# Patient Record
Sex: FEMALE | Race: BLACK OR AFRICAN AMERICAN | Employment: FULL TIME | URBAN - METROPOLITAN AREA
[De-identification: names, ages, dates, MRNs, and addresses within clinical notes are randomized per-mention and may not be internally consistent; named-entity substitution may affect disease eponyms.]

---

## 2019-09-13 ENCOUNTER — TELEPHONE (OUTPATIENT)
Dept: FAMILY MEDICINE CLINIC | Age: 24
End: 2019-09-13

## 2019-09-13 ENCOUNTER — OFFICE VISIT (OUTPATIENT)
Dept: FAMILY MEDICINE CLINIC | Age: 24
End: 2019-09-13
Payer: COMMERCIAL

## 2019-09-13 VITALS
SYSTOLIC BLOOD PRESSURE: 142 MMHG | BODY MASS INDEX: 50.02 KG/M2 | WEIGHT: 293 LBS | HEIGHT: 64 IN | DIASTOLIC BLOOD PRESSURE: 86 MMHG

## 2019-09-13 DIAGNOSIS — Z76.89 ENCOUNTER TO ESTABLISH CARE: Primary | ICD-10-CM

## 2019-09-13 DIAGNOSIS — R35.89 POLYURIA: ICD-10-CM

## 2019-09-13 DIAGNOSIS — R19.4 FREQUENT BOWEL MOVEMENTS: ICD-10-CM

## 2019-09-13 DIAGNOSIS — E66.01 CLASS 3 SEVERE OBESITY WITH SERIOUS COMORBIDITY AND BODY MASS INDEX (BMI) OF 60.0 TO 69.9 IN ADULT, UNSPECIFIED OBESITY TYPE (HCC): ICD-10-CM

## 2019-09-13 DIAGNOSIS — Z12.4 CERVICAL CANCER SCREENING: ICD-10-CM

## 2019-09-13 DIAGNOSIS — N39.46 MIXED INCONTINENCE: ICD-10-CM

## 2019-09-13 DIAGNOSIS — Z86.69 HISTORY OF ABSENCE SEIZURES: ICD-10-CM

## 2019-09-13 DIAGNOSIS — R03.0 ELEVATED BP WITHOUT DIAGNOSIS OF HYPERTENSION: ICD-10-CM

## 2019-09-13 LAB
A/G RATIO: 1.4 (ref 1.1–2.2)
ALBUMIN SERPL-MCNC: 4.2 G/DL (ref 3.4–5)
ALP BLD-CCNC: 78 U/L (ref 40–129)
ALT SERPL-CCNC: 10 U/L (ref 10–40)
ANION GAP SERPL CALCULATED.3IONS-SCNC: 13 MMOL/L (ref 3–16)
AST SERPL-CCNC: 12 U/L (ref 15–37)
BILIRUB SERPL-MCNC: <0.2 MG/DL (ref 0–1)
BUN BLDV-MCNC: 9 MG/DL (ref 7–20)
CALCIUM SERPL-MCNC: 8.9 MG/DL (ref 8.3–10.6)
CHLORIDE BLD-SCNC: 103 MMOL/L (ref 99–110)
CO2: 24 MMOL/L (ref 21–32)
CREAT SERPL-MCNC: 0.6 MG/DL (ref 0.6–1.1)
GFR AFRICAN AMERICAN: >60
GFR NON-AFRICAN AMERICAN: >60
GLOBULIN: 3 G/DL
GLUCOSE BLD-MCNC: 100 MG/DL (ref 70–99)
HCT VFR BLD CALC: 29.9 % (ref 36–48)
HEMATOLOGY PATH CONSULT: YES
HEMOGLOBIN: 8.8 G/DL (ref 12–16)
MCH RBC QN AUTO: 18.8 PG (ref 26–34)
MCHC RBC AUTO-ENTMCNC: 29.5 G/DL (ref 31–36)
MCV RBC AUTO: 63.8 FL (ref 80–100)
PDW BLD-RTO: 19.3 % (ref 12.4–15.4)
PLATELET # BLD: 372 K/UL (ref 135–450)
PMV BLD AUTO: 9 FL (ref 5–10.5)
POTASSIUM SERPL-SCNC: 4.3 MMOL/L (ref 3.5–5.1)
RBC # BLD: 4.69 M/UL (ref 4–5.2)
SLIDE REVIEW: ABNORMAL
SODIUM BLD-SCNC: 140 MMOL/L (ref 136–145)
TOTAL PROTEIN: 7.2 G/DL (ref 6.4–8.2)
WBC # BLD: 5.4 K/UL (ref 4–11)

## 2019-09-13 PROCEDURE — A4663 DIALYSIS BLOOD PRESSURE CUFF: HCPCS | Performed by: FAMILY MEDICINE

## 2019-09-13 PROCEDURE — 99202 OFFICE O/P NEW SF 15 MIN: CPT | Performed by: FAMILY MEDICINE

## 2019-09-13 PROCEDURE — 36415 COLL VENOUS BLD VENIPUNCTURE: CPT | Performed by: FAMILY MEDICINE

## 2019-09-13 RX ORDER — LAMOTRIGINE 50 MG/1
150 TABLET, EXTENDED RELEASE ORAL DAILY
Status: ON HOLD | COMMUNITY
Start: 2017-06-05 | End: 2022-05-01

## 2019-09-13 ASSESSMENT — ENCOUNTER SYMPTOMS
VOMITING: 0
ABDOMINAL PAIN: 1
SORE THROAT: 0
NAUSEA: 0
COUGH: 0
SHORTNESS OF BREATH: 0
WHEEZING: 0
DIARRHEA: 0

## 2019-09-13 ASSESSMENT — PATIENT HEALTH QUESTIONNAIRE - PHQ9
SUM OF ALL RESPONSES TO PHQ9 QUESTIONS 1 & 2: 0
1. LITTLE INTEREST OR PLEASURE IN DOING THINGS: 0
SUM OF ALL RESPONSES TO PHQ QUESTIONS 1-9: 0
SUM OF ALL RESPONSES TO PHQ QUESTIONS 1-9: 0
2. FEELING DOWN, DEPRESSED OR HOPELESS: 0

## 2019-09-13 NOTE — PROGRESS NOTES
Conjunctivae are normal. Right eye exhibits no discharge. Left eye exhibits no discharge. Cardiovascular: Normal rate, regular rhythm and normal heart sounds. No murmur heard. Pulmonary/Chest: Effort normal and breath sounds normal. She has no wheezes. Abdominal: Soft. Bowel sounds are normal. There is no tenderness. Neurological: She is alert and oriented to person, place, and time. Skin: Skin is warm and dry. Psychiatric: She has a normal mood and affect. Her behavior is normal. Judgment and thought content normal.       ASSESSMENT/PLAN:  1. Encounter to establish care  Care established  Medications reviewed  Questions answered  Labs obtained  RTC in one month for follow up. 2. History of absence seizures  Referred to Neurology  Discussed medication use, patient forgets to take her medication  Discussed signs and symptoms for immediate evaluation in the ER.  - AFL - Nahomy Wise MD, Neurology, [facility]  - CBC  - Comprehensive Metabolic Panel  - Hemoglobin A1C  - LA DIALYSIS BLOOD PRESSURE CUFF    3. Mixed incontinence  Refer to Urology  Patient had questions answered during the exam.  - Heike Medina MD, [facility], [facility]  - CBC  - Comprehensive Metabolic Panel  - Hemoglobin A1C  - LA DIALYSIS BLOOD PRESSURE CUFF  - POCT urine pregnancy; Future  - POCT Urinalysis no Micro; Future    4. Class 3 severe obesity with serious comorbidity and body mass index (BMI) of 60.0 to 69.9 in adult, unspecified obesity type ([address]  Discussed the need to exercise 30 minutes each day. Monitor diet and push water. Reduce refined carbs and replace with fiber and vegetables. Decrease portion size and limit snacking, stop eating after dinner  Count calories. - CBC  - Comprehensive Metabolic Panel  - Hemoglobin A1C  - LA DIALYSIS BLOOD PRESSURE CUFF    5.  Cervical cancer screening  Refer to GYN for exam  - Katharine Dias MD, Gynecology, [facility]

## 2019-09-14 LAB
ESTIMATED AVERAGE GLUCOSE: 131.2 MG/DL
HBA1C MFR BLD: 6.2 %

## 2019-09-16 ENCOUNTER — TELEPHONE (OUTPATIENT)
Dept: FAMILY MEDICINE CLINIC | Age: 24
End: 2019-09-16

## 2019-09-16 LAB — HEMATOLOGY PATH CONSULT: NORMAL

## 2019-09-16 ASSESSMENT — ENCOUNTER SYMPTOMS
CONSTIPATION: 0
ANAL BLEEDING: 0
BLOOD IN STOOL: 0
RECTAL PAIN: 0

## 2019-09-16 NOTE — TELEPHONE ENCOUNTER
I called and LVM informing the patient that there is a $35 fee to process her forms. We can process that payment over the phone and Dr. Shay Jacobsen wanted to talk to the patient when she picked these up, but it does say she wanted the forms faxed, so she might not be coming in to  these forms.

## 2019-10-11 ENCOUNTER — OFFICE VISIT (OUTPATIENT)
Dept: FAMILY MEDICINE CLINIC | Age: 24
End: 2019-10-11
Payer: COMMERCIAL

## 2019-10-11 VITALS
WEIGHT: 293 LBS | BODY MASS INDEX: 50.02 KG/M2 | DIASTOLIC BLOOD PRESSURE: 88 MMHG | SYSTOLIC BLOOD PRESSURE: 130 MMHG | HEIGHT: 64 IN

## 2019-10-11 DIAGNOSIS — R73.03 PRE-DIABETES: ICD-10-CM

## 2019-10-11 DIAGNOSIS — D64.9 ANEMIA, UNSPECIFIED TYPE: Primary | ICD-10-CM

## 2019-10-11 DIAGNOSIS — E66.01 CLASS 3 SEVERE OBESITY DUE TO EXCESS CALORIES WITHOUT SERIOUS COMORBIDITY WITH BODY MASS INDEX (BMI) OF 60.0 TO 69.9 IN ADULT (HCC): ICD-10-CM

## 2019-10-11 DIAGNOSIS — Z12.4 CERVICAL CANCER SCREENING: ICD-10-CM

## 2019-10-11 PROCEDURE — 99213 OFFICE O/P EST LOW 20 MIN: CPT | Performed by: FAMILY MEDICINE

## 2019-10-11 ASSESSMENT — ENCOUNTER SYMPTOMS
CHEST TIGHTNESS: 0
DIARRHEA: 0
COUGH: 0
SORE THROAT: 0
SINUS PRESSURE: 0
RHINORRHEA: 0
SHORTNESS OF BREATH: 0
NAUSEA: 0
VOMITING: 0
ABDOMINAL PAIN: 0
WHEEZING: 0

## 2019-10-13 ASSESSMENT — ENCOUNTER SYMPTOMS
COLOR CHANGE: 0
EYE DISCHARGE: 0

## 2019-12-03 ENCOUNTER — TELEPHONE (OUTPATIENT)
Dept: FAMILY MEDICINE CLINIC | Age: 24
End: 2019-12-03

## 2020-02-07 ENCOUNTER — HOSPITAL ENCOUNTER (EMERGENCY)
Age: 25
Discharge: HOME | End: 2020-02-07
Payer: COMMERCIAL

## 2020-02-07 VITALS
SYSTOLIC BLOOD PRESSURE: 124 MMHG | BODY MASS INDEX: 50.02 KG/M2 | HEIGHT: 64 IN | RESPIRATION RATE: 17 BRPM | HEART RATE: 84 BPM | TEMPERATURE: 98.9 F | DIASTOLIC BLOOD PRESSURE: 73 MMHG | WEIGHT: 293 LBS | OXYGEN SATURATION: 97 %

## 2020-02-07 LAB
RAPID INFLUENZA  B AGN: NEGATIVE
RAPID INFLUENZA A AGN: POSITIVE
S PYO AG THROAT QL: NEGATIVE

## 2020-02-07 PROCEDURE — 87804 INFLUENZA ASSAY W/OPTIC: CPT

## 2020-02-07 PROCEDURE — 87880 STREP A ASSAY W/OPTIC: CPT

## 2020-02-07 PROCEDURE — 87081 CULTURE SCREEN ONLY: CPT

## 2020-02-07 PROCEDURE — 6370000000 HC RX 637 (ALT 250 FOR IP): Performed by: PHYSICIAN ASSISTANT

## 2020-02-07 PROCEDURE — 99283 EMERGENCY DEPT VISIT LOW MDM: CPT

## 2020-02-07 RX ORDER — IBUPROFEN 400 MG/1
800 TABLET ORAL ONCE
Status: COMPLETED | OUTPATIENT
Start: 2020-02-07 | End: 2020-02-07

## 2020-02-07 RX ORDER — ACETAMINOPHEN 500 MG
500 TABLET ORAL EVERY 6 HOURS PRN
Qty: 30 TABLET | Refills: 0 | Status: ON HOLD | OUTPATIENT
Start: 2020-02-07 | End: 2022-05-01

## 2020-02-07 RX ORDER — GUAIFENESIN/DEXTROMETHORPHAN 100-10MG/5
5 SYRUP ORAL 3 TIMES DAILY PRN
Qty: 120 ML | Refills: 0 | Status: SHIPPED | OUTPATIENT
Start: 2020-02-07 | End: 2020-02-17

## 2020-02-07 RX ORDER — IBUPROFEN 800 MG/1
800 TABLET ORAL EVERY 8 HOURS PRN
Qty: 30 TABLET | Refills: 0 | Status: ON HOLD | OUTPATIENT
Start: 2020-02-07 | End: 2022-05-01

## 2020-02-07 RX ADMIN — IBUPROFEN 800 MG: 400 TABLET ORAL at 19:40

## 2020-02-07 ASSESSMENT — ENCOUNTER SYMPTOMS
ABDOMINAL PAIN: 0
DIARRHEA: 0
BACK PAIN: 0
EYE PAIN: 0
NAUSEA: 0
VOMITING: 0
SHORTNESS OF BREATH: 0
COUGH: 1

## 2020-02-07 ASSESSMENT — PAIN SCALES - GENERAL
PAINLEVEL_OUTOF10: 4
PAINLEVEL_OUTOF10: 4

## 2020-02-07 ASSESSMENT — PAIN DESCRIPTION - DESCRIPTORS
DESCRIPTORS: SORE
DESCRIPTORS: SHOOTING

## 2020-02-07 ASSESSMENT — PAIN DESCRIPTION - LOCATION
LOCATION: THROAT
LOCATION: THROAT

## 2020-02-07 ASSESSMENT — PAIN - FUNCTIONAL ASSESSMENT: PAIN_FUNCTIONAL_ASSESSMENT: 0-10

## 2020-02-07 ASSESSMENT — PAIN DESCRIPTION - FREQUENCY
FREQUENCY: CONTINUOUS
FREQUENCY: CONTINUOUS

## 2020-02-07 ASSESSMENT — PAIN DESCRIPTION - PAIN TYPE
TYPE: ACUTE PAIN
TYPE: ACUTE PAIN

## 2020-02-07 NOTE — ED PROVIDER NOTES
**EVALUATED BY ADVANCED PRACTICE PROVIDER**        [address]      Pt Name: Joshua Peña  NCJ:[id number]  [place] 1995  Date of evaluation: 2/7/2020  Provider: ELENA Londono      Chief Complaint:    Chief Complaint   Patient presents with    Nasal Congestion     for 2 days     Cough    Pharyngitis       Nursing Notes, Past Medical Hx, Past Surgical Hx, Social Hx, Allergies, and Family Hx were all reviewed and agreed with or any disagreements were addressed in the HPI.    HPI:  (Location, Duration, Timing, Severity, Quality, Assoc Sx, Context, Modifying factors)  This is a  26 y.o. female who presents to the ED for evaluation of URI symptoms. Onset: 3.5 days. endorses nasal congestion, cough, body aches, sore throat. Severity is rated as 4/10. Described as sore in character. Timing: constant. Patient denies neck pain/stiffness, rash. The patient denies fever or chills, chest pain, shortness of breath, abdominal pain, nausea, vomiting, diarrhea. No recent travel, exposure to sick contacts, or recent antibiotics. No other acute concerns, associated symptoms or modifying factors. PastMedical/Surgical History:      Diagnosis Date    H/O absence seizures 9/13/2019     History reviewed. No pertinent surgical history. Medications:  Previous Medications    FESOTERODINE FUMARATE (TOVIAZ PO)    Take by mouth    LAMOTRIGINE (LAMICTAL XR) 50 MG EXTENDED RELEASE TABLET    Take 150 mg by mouth daily         Review of Systems:  Review of Systems   Constitutional: Negative for chills, fatigue and fever. HENT:        Per HPI   Eyes: Negative for pain. Respiratory: Positive for cough. Negative for shortness of breath. Cardiovascular: Negative for chest pain. Gastrointestinal: Negative for abdominal pain, diarrhea, nausea and vomiting. Genitourinary: Negative for dysuria.    Musculoskeletal: Negative for back pain, neck pain and neck stiffness. Skin: Negative for rash. Neurological: Negative for dizziness and headaches. Psychiatric/Behavioral: Negative for confusion. Positives and Pertinent negatives as per HPI. Except as noted above in the ROS, problem specific ROS was completed and is negative. Physical Exam:  Physical Exam  Vitals signs and nursing note reviewed. Constitutional:       General: She is not in acute distress. Appearance: She is well-developed. She is obese. She is not diaphoretic. HENT:      Head: Normocephalic and atraumatic. Eyes:      General:         Right eye: No discharge. Left eye: No discharge. Neck:      Musculoskeletal: Normal range of motion and neck supple. Cardiovascular:      Heart sounds: Normal heart sounds. Pulmonary:      Effort: Pulmonary effort is normal. No respiratory distress. Breath sounds: Normal breath sounds. No stridor. Musculoskeletal: Normal range of motion. Skin:     General: Skin is warm and dry. Coloration: Skin is not pale. Neurological:      Mental Status: She is alert and oriented to person, place, and time. Comments: No gross facial drooping. Moves all 4 extremities spontaneously.    Psychiatric:         Behavior: Behavior normal.         MEDICAL DECISION MAKING    Vitals:    Vitals:    02/07/20 1816   BP: 124/73   Pulse: 84   Resp: 17   Temp: 98.9 °F (37.2 °C)   TempSrc: Oral   SpO2: 97%   Weight: (!) 412 lb 0.6 oz (186.9 kg)   Height: 5' 4\" (1.626 m)       LABS:  Labs Reviewed   RAPID INFLUENZA A/B ANTIGENS - Abnormal; Notable for the following components:       Result Value    Rapid Influenza A Ag POSITIVE (*)     All other components within normal limits    Narrative:     Performed at:  [facility]  [address] [place], [place] [address]   Phone [phone]   STREP SCREEN GROUP A THROAT    Narrative:     Performed at:  [facility]  [address] [facility], [place]   Phone [phone]   CULTURE BETA STREP CONFIRM PLATE        Remainder of labs reviewed and werenegative at this time or not returned at the time of this note. RADIOLOGY:   Non-plain film images such as CT, Ultrasound and MRI are read by the radiologist. ELENA West have directly visualized the radiologic plain film image(s) with the below findings:        Interpretation per the Radiologist below, if available at the time of this note:    No orders to display        No results found. MEDICAL DECISION MAKING / ED COURSE:      PROCEDURES:   Procedures    None    Patient was given:  Medications   ibuprofen (ADVIL;MOTRIN) tablet 800 mg (has no administration in time range)       Differential diagnoses: Airway Obstruction, Epiglottitis, Retropharyngeal Abscess, Parapharyngeal Abscess, Pneumonia, Hypoxemia, Dehydration, upper respiratory infection   Differential diagnosis: Group A strep, Airway Obstruction, Epiglottitis, Retropharyngeal Abscess, Parapharyngeal Abscess, Pneumonia, Hypoxemia, Dehydration, glomerulonephritis , Sydenham's chorea , rheumatic heart disease, other    Patient is afebrile with no evidence of stridor, drooling, posturing or respiratory distress. Well appearing. Flu swab was positive  Strep swab negative   Lungs were clear on exam;  I estimate there is LOW risk for PNEUMONIA, MENINGITIS, PERITONSILLAR ABSCESS, SEPSIS, MALIGNANT OTITIS EXTERNA, OR EPIGLOTTITIS thus I consider the discharge disposition reasonable. At 3-1/2 days of symptom onset she is outside window of benefit to begin treatment with Tamiflu. At this time I believe patient's presentation does not warrant further workup with labs or imaging in the emergency department and is stable for discharge home. I discussed with Michelle Butt and/or family the exam results, diagnosis, care, prognosis, reasons to return and the importance of follow up.  Patient will be discharged with instructions to

## 2020-02-09 LAB — S PYO THROAT QL CULT: NORMAL

## 2020-03-06 ENCOUNTER — OFFICE VISIT (OUTPATIENT)
Dept: FAMILY MEDICINE CLINIC | Age: 25
End: 2020-03-06
Payer: COMMERCIAL

## 2020-03-06 VITALS
BODY MASS INDEX: 50.02 KG/M2 | SYSTOLIC BLOOD PRESSURE: 126 MMHG | HEIGHT: 64 IN | WEIGHT: 293 LBS | DIASTOLIC BLOOD PRESSURE: 82 MMHG

## 2020-03-06 LAB
CONTROL: NORMAL
PREGNANCY TEST URINE, POC: NORMAL

## 2020-03-06 PROCEDURE — 81025 URINE PREGNANCY TEST: CPT | Performed by: FAMILY MEDICINE

## 2020-03-06 PROCEDURE — 99214 OFFICE O/P EST MOD 30 MIN: CPT | Performed by: FAMILY MEDICINE

## 2020-03-06 ASSESSMENT — PATIENT HEALTH QUESTIONNAIRE - PHQ9
SUM OF ALL RESPONSES TO PHQ QUESTIONS 1-9: 0
SUM OF ALL RESPONSES TO PHQ9 QUESTIONS 1 & 2: 0
SUM OF ALL RESPONSES TO PHQ QUESTIONS 1-9: 0
2. FEELING DOWN, DEPRESSED OR HOPELESS: 0
1. LITTLE INTEREST OR PLEASURE IN DOING THINGS: 0

## 2020-03-06 ASSESSMENT — ENCOUNTER SYMPTOMS
TROUBLE SWALLOWING: 0
VOMITING: 0
CHEST TIGHTNESS: 0
SHORTNESS OF BREATH: 0
NAUSEA: 0
SINUS PRESSURE: 0
COUGH: 0
DIARRHEA: 0
WHEEZING: 0
RHINORRHEA: 0
ABDOMINAL PAIN: 0
SORE THROAT: 0

## 2020-03-06 NOTE — PROGRESS NOTES
numbness and headaches. Hematological: Does not bruise/bleed easily. Psychiatric/Behavioral: Negative for dysphoric mood. The patient is not nervous/anxious. Prior to Visit Medications    Medication Sig Taking? Authorizing Provider   ibuprofen (ADVIL;MOTRIN) 800 MG tablet Take 1 tablet by mouth every 8 hours as needed for Pain  Patient not taking: Reported on 3/6/2020  ELENA Almanzar   acetaminophen (APAP EXTRA STRENGTH) 500 MG tablet Take 1 tablet by mouth every 6 hours as needed for Pain  Patient not taking: Reported on 3/6/2020  ELENA Almanzar   Fesoterodine Fumarate (TOVIAZ PO) Take by mouth  Historical Provider, MD   lamoTRIgine (LAMICTAL XR) 50 MG extended release tablet Take 150 mg by mouth daily  Historical Provider, MD        Social History     Tobacco Use    Smoking status: Never Smoker    Smokeless tobacco: Never Used   Substance Use Topics    Alcohol use: Yes        Vitals:    03/06/20 1413   BP: 126/82   Weight: (!) 417 lb 9.6 oz (189.4 kg)   Height: 5' 4\" (1.626 m)     Estimated body mass index is 71.68 kg/m² as calculated from the following:    Height as of this encounter: 5' 4\" (1.626 m). Weight as of this encounter: 417 lb 9.6 oz (189.4 kg). Physical Exam  Vitals signs and nursing note reviewed. Constitutional:       Appearance: She is well-developed. She is obese. HENT:      Head: Normocephalic and atraumatic. Right Ear: Tympanic membrane, ear canal and external ear normal.      Left Ear: Tympanic membrane, ear canal and external ear normal.      Nose: Nose normal.      Mouth/Throat:      Mouth: Mucous membranes are moist.   Eyes:      Conjunctiva/sclera: Conjunctivae normal.      Pupils: Pupils are equal, round, and reactive to light. Cardiovascular:      Rate and Rhythm: Normal rate and regular rhythm. Heart sounds: Normal heart sounds. No murmur. Pulmonary:      Effort: Pulmonary effort is normal.      Breath sounds: Normal breath sounds.  No wheezing. Abdominal:      General: Bowel sounds are normal.      Palpations: Abdomen is soft. Tenderness: There is no abdominal tenderness. Skin:     General: Skin is warm and dry. Neurological:      Mental Status: She is alert and oriented to person, place, and time. ASSESSMENT/PLAN:  1. Urinary incontinence, unspecified type  Stable today, had evaluation with Urologist  Will review note  Patient is needing note for frequent bathroom breaks at work due to this condition. Continue with medication  Keep appointments with specialist.   Clotilde Rabago MD, Bariatric Surgery, [facility]  - POCT urine pregnancy    2. Class 3 severe obesity with serious comorbidity and body mass index (BMI) greater than or equal to 70 in adult, unspecified obesity type ([address]  Discussed the need to exercise 30 minutes each day. Monitor diet and push water. Reduce refined carbs and replace with fiber and vegetables. Decrease portion size and limit snacking, stop eating after dinner  Count calories. Referred for surgery consult  - POCT urine pregnancy    3. Irregular periods/menstrual cycles  Referred for procedure  Educated on the importance of having this done. Answered questions      Return in about 3 months (around 6/6/2020).

## 2020-03-10 ENCOUNTER — TELEPHONE (OUTPATIENT)
Dept: FAMILY MEDICINE CLINIC | Age: 25
End: 2020-03-10

## 2020-03-11 NOTE — TELEPHONE ENCOUNTER
Please contact the patient and find out if she is still taking medication provided by Urologist? She was supposed to return to the Urologist after 4 weeks if not improved. To continue to be able to have additional bathroom breaks she needs management in order to justify the request.  Also, the patient needs repeat blood work. She was supposed to have this completed but never did. It is in the chart and she needs to have this done as soon as she can. Finally, her paperwork has been filled out.     Thank you for the help,   Dr Rikki Peguero

## 2020-03-11 NOTE — TELEPHONE ENCOUNTER
Pt informed of message about paperwork. States she is taking medication provided by urologist, does not have follow up appt scheduled due to high co-pay. States she is trying to make it work.    Also scheduled Friday 3/13 for repeat labs

## 2021-03-25 ENCOUNTER — OFFICE VISIT (OUTPATIENT)
Dept: FAMILY MEDICINE CLINIC | Age: 26
End: 2021-03-25
Payer: COMMERCIAL

## 2021-03-25 VITALS
DIASTOLIC BLOOD PRESSURE: 78 MMHG | WEIGHT: 293 LBS | SYSTOLIC BLOOD PRESSURE: 132 MMHG | BODY MASS INDEX: 48.82 KG/M2 | TEMPERATURE: 97.3 F | HEIGHT: 65 IN

## 2021-03-25 DIAGNOSIS — F41.9 ANXIETY: ICD-10-CM

## 2021-03-25 DIAGNOSIS — E66.01 CLASS 3 SEVERE OBESITY DUE TO EXCESS CALORIES WITHOUT SERIOUS COMORBIDITY WITH BODY MASS INDEX (BMI) GREATER THAN OR EQUAL TO 70 IN ADULT (HCC): Primary | ICD-10-CM

## 2021-03-25 DIAGNOSIS — F32.A DEPRESSION, UNSPECIFIED DEPRESSION TYPE: ICD-10-CM

## 2021-03-25 LAB
A/G RATIO: 1.3 (ref 1.1–2.2)
ALBUMIN SERPL-MCNC: 4.3 G/DL (ref 3.4–5)
ALP BLD-CCNC: 85 U/L (ref 40–129)
ALT SERPL-CCNC: 12 U/L (ref 10–40)
ANION GAP SERPL CALCULATED.3IONS-SCNC: 11 MMOL/L (ref 3–16)
AST SERPL-CCNC: 14 U/L (ref 15–37)
BILIRUB SERPL-MCNC: <0.2 MG/DL (ref 0–1)
BUN BLDV-MCNC: 9 MG/DL (ref 7–20)
CALCIUM SERPL-MCNC: 9.7 MG/DL (ref 8.3–10.6)
CHLORIDE BLD-SCNC: 106 MMOL/L (ref 99–110)
CO2: 27 MMOL/L (ref 21–32)
CREAT SERPL-MCNC: 0.7 MG/DL (ref 0.6–1.1)
GFR AFRICAN AMERICAN: >60
GFR NON-AFRICAN AMERICAN: >60
GLOBULIN: 3.4 G/DL
GLUCOSE BLD-MCNC: 93 MG/DL (ref 70–99)
HCT VFR BLD CALC: 31.5 % (ref 36–48)
HEMATOLOGY PATH CONSULT: NO
HEMOGLOBIN: 9.7 G/DL (ref 12–16)
MCH RBC QN AUTO: 20.8 PG (ref 26–34)
MCHC RBC AUTO-ENTMCNC: 30.9 G/DL (ref 31–36)
MCV RBC AUTO: 67.2 FL (ref 80–100)
PDW BLD-RTO: 18.8 % (ref 12.4–15.4)
PLATELET # BLD: 398 K/UL (ref 135–450)
PMV BLD AUTO: 8.4 FL (ref 5–10.5)
POTASSIUM SERPL-SCNC: 4.7 MMOL/L (ref 3.5–5.1)
RBC # BLD: 4.69 M/UL (ref 4–5.2)
SODIUM BLD-SCNC: 144 MMOL/L (ref 136–145)
TOTAL PROTEIN: 7.7 G/DL (ref 6.4–8.2)
WBC # BLD: 6.6 K/UL (ref 4–11)

## 2021-03-25 PROCEDURE — 36415 COLL VENOUS BLD VENIPUNCTURE: CPT | Performed by: FAMILY MEDICINE

## 2021-03-25 PROCEDURE — 99214 OFFICE O/P EST MOD 30 MIN: CPT | Performed by: FAMILY MEDICINE

## 2021-03-25 RX ORDER — ESCITALOPRAM OXALATE 10 MG/1
10 TABLET ORAL DAILY
Qty: 30 TABLET | Refills: 3 | Status: ON HOLD | OUTPATIENT
Start: 2021-03-25 | End: 2022-05-01

## 2021-03-25 ASSESSMENT — ENCOUNTER SYMPTOMS
CHEST TIGHTNESS: 0
SINUS PRESSURE: 0
COUGH: 0
RHINORRHEA: 0
ABDOMINAL PAIN: 0
DIARRHEA: 0
TROUBLE SWALLOWING: 0
SHORTNESS OF BREATH: 0
WHEEZING: 0
VOMITING: 0
NAUSEA: 0
SORE THROAT: 0

## 2021-03-25 ASSESSMENT — PATIENT HEALTH QUESTIONNAIRE - PHQ9
SUM OF ALL RESPONSES TO PHQ QUESTIONS 1-9: 14
9. THOUGHTS THAT YOU WOULD BE BETTER OFF DEAD, OR OF HURTING YOURSELF: 0
5. POOR APPETITE OR OVEREATING: 0
6. FEELING BAD ABOUT YOURSELF - OR THAT YOU ARE A FAILURE OR HAVE LET YOURSELF OR YOUR FAMILY DOWN: 0
7. TROUBLE CONCENTRATING ON THINGS, SUCH AS READING THE NEWSPAPER OR WATCHING TELEVISION: 2

## 2021-03-25 NOTE — PROGRESS NOTES
3/25/2021     Soha Geiger (:  1995) is a 23 y.o. female, here for evaluation of the following medical concerns:    HPI     1. Anemia- patient's last Hemoglobin was 8.8, hx of  heavy menses, has not had pap in the past, no fatigue reported, denied lightheadedness. Has never been told that she was anemic in the past.    2.  Depression/anxiety- patient believe her mood is poorly controlled at this time,  PHQ-9 is 14, denied SI, stated that her anxiety seems to be worse and that this leads to her poor mood at times, recently was  last year, believes this is going well    3. Obseity- patient's BMI is greater than 74, patient is wanting to discuss possible options, she and her family are trying to eat a heathy diet and increase exercise, she is wanting to discuss surgical options. She is requesting a referral to bariatric surgery. Today, chest pain, sob,n v, or diarrhea. Review of Systems   Constitutional: Positive for fatigue. Negative for activity change, fever and unexpected weight change. HENT: Negative for congestion, ear pain, rhinorrhea, sinus pressure, sore throat and trouble swallowing. Respiratory: Negative for cough, chest tightness, shortness of breath and wheezing. Cardiovascular: Negative for chest pain and leg swelling. Gastrointestinal: Negative for abdominal pain, diarrhea, nausea and vomiting. Endocrine: Negative for cold intolerance, heat intolerance, polydipsia and polyphagia. Genitourinary: Negative for flank pain. Musculoskeletal: Negative for arthralgias. Skin: Negative for rash. Neurological: Negative for dizziness, tremors, syncope, numbness and headaches. Psychiatric/Behavioral: Positive for dysphoric mood. The patient is nervous/anxious. Prior to Visit Medications    Medication Sig Taking?  Authorizing Provider   escitalopram (LEXAPRO) 10 MG tablet Take 1 tablet by mouth daily Yes Bennie Ring DO   ibuprofen (ADVIL;MOTRIN) 800 MG tablet Take 1 tablet by mouth every 8 hours as needed for Pain  Patient not taking: Reported on 3/6/2020  ELENA Gale   acetaminophen (APAP EXTRA STRENGTH) 500 MG tablet Take 1 tablet by mouth every 6 hours as needed for Pain  Patient not taking: Reported on 3/6/2020  ELENA Gale   Fesoterodine Fumarate (TOVIAZ PO) Take by mouth  Historical Provider, MD   lamoTRIgine (LAMICTAL XR) 50 MG extended release tablet Take 150 mg by mouth daily  Historical Provider, MD        Social History     Tobacco Use    Smoking status: Never Smoker    Smokeless tobacco: Never Used   Substance Use Topics    Alcohol use: Yes        Vitals:    03/25/21 1526   BP: 132/78   Temp: 97.3 °F (36.3 °C)   Weight: (!) 446 lb 9.6 oz (202.6 kg)   Height: 5' 4.75\" (1.645 m)     Estimated body mass index is 74.89 kg/m² as calculated from the following:    Height as of this encounter: 5' 4.75\" (1.645 m). Weight as of this encounter: 446 lb 9.6 oz (202.6 kg). Physical Exam  Vitals signs and nursing note reviewed. Constitutional:       Appearance: She is well-developed. HENT:      Head: Normocephalic and atraumatic. Right Ear: External ear normal.      Left Ear: External ear normal.      Nose: Nose normal.   Cardiovascular:      Rate and Rhythm: Normal rate and regular rhythm. Heart sounds: Normal heart sounds. Pulmonary:      Effort: Pulmonary effort is normal.      Breath sounds: Normal breath sounds. No wheezing. Abdominal:      General: Bowel sounds are normal.      Palpations: Abdomen is soft. Tenderness: There is no abdominal tenderness. Skin:     General: Skin is dry. Neurological:      General: No focal deficit present. Mental Status: She is alert and oriented to person, place, and time. Mental status is at baseline. Psychiatric:         Behavior: Behavior normal.         ASSESSMENT/PLAN:  1.  Class 3 severe obesity due to excess calories without serious comorbidity with body mass index (BMI) greater than or equal to 70 in adult [facility])  Discussed the need to exercise 30 minutes each day. Monitor diet and push water. Reduce refined carbs and replace with fiber and vegetables. Decrease portion size and limit snacking, stop eating after dinner  Count calories. - [address],  - Bariatric Surgery, [facility]  - CBC  - Comprehensive Metabolic Panel    2. Depression, unspecified depression type  Labs obtained  Medication provided  Discussed side effects of medication  Discussed signs and symptoms for immediate evaluation in ER. Answered questions. - [address],  - Bariatric Surgery, [facility]  - CBC  - Comprehensive Metabolic Panel    3. Anxiety  Labs obtained  Medication provided  Discussed side effects of medication  Discussed signs and symptoms for immediate evaluation in ER. Answered questions. - [address],  - Bariatric Surgery, [facility]  - CBC  - Comprehensive Metabolic Panel      No follow-ups on file.

## 2021-04-06 RX ORDER — FERROUS SULFATE 325(65) MG
325 TABLET ORAL
Qty: 30 TABLET | Refills: 2 | Status: ON HOLD | OUTPATIENT
Start: 2021-04-06 | End: 2022-05-01

## 2021-04-09 ENCOUNTER — TELEPHONE (OUTPATIENT)
Dept: FAMILY MEDICINE CLINIC | Age: 26
End: 2021-04-09

## 2021-04-09 NOTE — TELEPHONE ENCOUNTER
Phoned pt informed her of her blood work result message    She stated she uploaded forms for Dr Kristie Duval to complete, but that has not heard anything yet    Printed forms and put them on Dr Nishant Will desharsh for completion

## 2021-09-14 ENCOUNTER — APPOINTMENT (OUTPATIENT)
Dept: GENERAL RADIOLOGY | Age: 26
End: 2021-09-14
Payer: COMMERCIAL

## 2021-09-14 ENCOUNTER — HOSPITAL ENCOUNTER (EMERGENCY)
Age: 26
Discharge: HOME | End: 2021-09-14
Attending: STUDENT IN AN ORGANIZED HEALTH CARE EDUCATION/TRAINING PROGRAM
Payer: COMMERCIAL

## 2021-09-14 VITALS
TEMPERATURE: 99.4 F | HEART RATE: 78 BPM | OXYGEN SATURATION: 99 % | HEIGHT: 64 IN | BODY MASS INDEX: 50.02 KG/M2 | DIASTOLIC BLOOD PRESSURE: 71 MMHG | RESPIRATION RATE: 16 BRPM | SYSTOLIC BLOOD PRESSURE: 128 MMHG | WEIGHT: 293 LBS

## 2021-09-14 DIAGNOSIS — J06.9 ACUTE UPPER RESPIRATORY INFECTION: Primary | ICD-10-CM

## 2021-09-14 LAB
A/G RATIO: 1 (ref 1.1–2.2)
ALBUMIN SERPL-MCNC: 3.8 G/DL (ref 3.4–5)
ALP BLD-CCNC: 90 U/L (ref 40–129)
ALT SERPL-CCNC: 15 U/L (ref 10–40)
ANION GAP SERPL CALCULATED.3IONS-SCNC: 9 MMOL/L (ref 3–16)
AST SERPL-CCNC: 16 U/L (ref 15–37)
BASOPHILS ABSOLUTE: 0 K/UL (ref 0–0.2)
BASOPHILS RELATIVE PERCENT: 0.4 %
BILIRUB SERPL-MCNC: <0.2 MG/DL (ref 0–1)
BILIRUBIN URINE: NEGATIVE
BLOOD, URINE: NEGATIVE
BUN BLDV-MCNC: 8 MG/DL (ref 7–20)
CALCIUM SERPL-MCNC: 9.3 MG/DL (ref 8.3–10.6)
CHLORIDE BLD-SCNC: 102 MMOL/L (ref 99–110)
CLARITY: CLEAR
CO2: 27 MMOL/L (ref 21–32)
COLOR: YELLOW
CREAT SERPL-MCNC: 0.8 MG/DL (ref 0.6–1.1)
D DIMER: <200 NG/ML DDU (ref 0–229)
EOSINOPHILS ABSOLUTE: 0.1 K/UL (ref 0–0.6)
EOSINOPHILS RELATIVE PERCENT: 1.6 %
GFR AFRICAN AMERICAN: >60
GFR NON-AFRICAN AMERICAN: >60
GLOBULIN: 3.8 G/DL
GLUCOSE BLD-MCNC: 141 MG/DL (ref 70–99)
GLUCOSE URINE: NEGATIVE MG/DL
HCT VFR BLD CALC: 33.5 % (ref 36–48)
HEMOGLOBIN: 10.4 G/DL (ref 12–16)
KETONES, URINE: NEGATIVE MG/DL
LEUKOCYTE ESTERASE, URINE: NEGATIVE
LYMPHOCYTES ABSOLUTE: 1.6 K/UL (ref 1–5.1)
LYMPHOCYTES RELATIVE PERCENT: 19.5 %
MCH RBC QN AUTO: 21.8 PG (ref 26–34)
MCHC RBC AUTO-ENTMCNC: 30.9 G/DL (ref 31–36)
MCV RBC AUTO: 70.5 FL (ref 80–100)
MICROSCOPIC EXAMINATION: NORMAL
MONOCYTES ABSOLUTE: 0.5 K/UL (ref 0–1.3)
MONOCYTES RELATIVE PERCENT: 6.7 %
NEUTROPHILS ABSOLUTE: 5.7 K/UL (ref 1.7–7.7)
NEUTROPHILS RELATIVE PERCENT: 71.8 %
NITRITE, URINE: NEGATIVE
PDW BLD-RTO: 18.4 % (ref 12.4–15.4)
PH UA: 6.5 (ref 5–8)
PLATELET # BLD: 305 K/UL (ref 135–450)
PMV BLD AUTO: 8.3 FL (ref 5–10.5)
POTASSIUM REFLEX MAGNESIUM: 4 MMOL/L (ref 3.5–5.1)
PROTEIN UA: NEGATIVE MG/DL
RBC # BLD: 4.75 M/UL (ref 4–5.2)
SARS-COV-2, NAAT: NOT DETECTED
SODIUM BLD-SCNC: 138 MMOL/L (ref 136–145)
SPECIFIC GRAVITY UA: 1.01 (ref 1–1.03)
TOTAL PROTEIN: 7.6 G/DL (ref 6.4–8.2)
URINE REFLEX TO CULTURE: NORMAL
URINE TYPE: NORMAL
UROBILINOGEN, URINE: 0.2 E.U./DL
WBC # BLD: 8 K/UL (ref 4–11)

## 2021-09-14 PROCEDURE — 93005 ELECTROCARDIOGRAM TRACING: CPT | Performed by: STUDENT IN AN ORGANIZED HEALTH CARE EDUCATION/TRAINING PROGRAM

## 2021-09-14 PROCEDURE — 36415 COLL VENOUS BLD VENIPUNCTURE: CPT

## 2021-09-14 PROCEDURE — 71046 X-RAY EXAM CHEST 2 VIEWS: CPT

## 2021-09-14 PROCEDURE — 80053 COMPREHEN METABOLIC PANEL: CPT

## 2021-09-14 PROCEDURE — 87635 SARS-COV-2 COVID-19 AMP PRB: CPT

## 2021-09-14 PROCEDURE — 85025 COMPLETE CBC W/AUTO DIFF WBC: CPT

## 2021-09-14 PROCEDURE — 99285 EMERGENCY DEPT VISIT HI MDM: CPT

## 2021-09-14 PROCEDURE — 81003 URINALYSIS AUTO W/O SCOPE: CPT

## 2021-09-14 PROCEDURE — 85379 FIBRIN DEGRADATION QUANT: CPT

## 2021-09-14 ASSESSMENT — PAIN SCALES - GENERAL
PAINLEVEL_OUTOF10: 0
PAINLEVEL_OUTOF10: 3
PAINLEVEL_OUTOF10: 7

## 2021-09-14 ASSESSMENT — PAIN DESCRIPTION - DESCRIPTORS
DESCRIPTORS: BURNING;SHOOTING
DESCRIPTORS: HEADACHE

## 2021-09-14 ASSESSMENT — PAIN DESCRIPTION - LOCATION
LOCATION: HEAD
LOCATION: HEAD

## 2021-09-14 ASSESSMENT — PAIN - FUNCTIONAL ASSESSMENT
PAIN_FUNCTIONAL_ASSESSMENT: 0-10
PAIN_FUNCTIONAL_ASSESSMENT: ACTIVITIES ARE NOT PREVENTED

## 2021-09-14 ASSESSMENT — PAIN DESCRIPTION - ORIENTATION: ORIENTATION: INNER

## 2021-09-14 ASSESSMENT — PAIN DESCRIPTION - PAIN TYPE: TYPE: ACUTE PAIN

## 2021-09-14 ASSESSMENT — PAIN DESCRIPTION - PROGRESSION: CLINICAL_PROGRESSION: NOT CHANGED

## 2021-09-14 ASSESSMENT — PAIN DESCRIPTION - FREQUENCY: FREQUENCY: CONTINUOUS

## 2021-09-14 ASSESSMENT — PAIN DESCRIPTION - ONSET: ONSET: ON-GOING

## 2021-09-14 NOTE — ED NOTES
Report given to receiving REAGAN Rich, all questions answered.       Ced Benavidez RN  09/14/21 1945

## 2021-09-14 NOTE — ED PROVIDER NOTES
[facility]  [address] [place] [address]   Phone [phone], RAPID    Narrative:     Performed at:  [facility]  [address] [place] [address]   Phone [phone]   URINE RT REFLEX TO CULTURE    Narrative:     Performed at:  [facility]  [address] [place] [address]   Phone [phone]   D-DIMER, QUANTITATIVE    Narrative:     Performed at:  [facility]  [address] [place] [address]   Phone [phone]      XR CHEST (2 VW)   Final Result   No acute cardiopulmonary process. EKG INTERPRETATION:  EKG by my preliminary interpretation shows sinus rhythm with rate of 96, normal axis, normal intervals, with no ST changes indicative of ischemia at this time. PROCEDURES:   Procedures    ASSESSMENT AND PLAN:  John Brown is a 23 y.o. female presents with URI symptoms concern for COVID-19. Eye exam was unremarkable. Labs including EKG chest x-ray were all unremarkable. COVID-19 was also negative. Of her symptoms may be secondary to viral infection. Patient is stable no indication of admission. D-dimer was normal ruling out PE. She was discharged home recommendation to follow-up with primary care doctor. ClINICAL IMPRESSION:  1.  Acute upper respiratory infection          PATIENT REFERRED TO:  Brodie Homans, DO  [address]  [phone]    Schedule an appointment as soon as possible for a visit in 2 days        DISCHARGE MEDICATIONS:  Discharge Medication List as of 9/14/2021  9:52 PM        DISCONTINUED MEDICATIONS:  Discharge Medication List as of 9/14/2021  9:52 PM        DISPOSITION    Discharge  -We have instructed the patient, John Brown) to return to the ED or call her PCP if her pain/symptoms worsen. -Findings and recommendations explained to patient. She expressed understanding and agreed with the plan. Pro Tran MD (electronically signed)  9/16/2021  _________________________________________________________________________________________  _________________________________________________________________________________________  This record is transcribed utilizing voice recognition technology. There are inherent limitations in this technology. In addition, there may be limitations in editing of this report. If there are any discrepancies, please contact me directly.         Pro Tran MD  09/16/21 5093

## 2021-09-14 NOTE — ED NOTES
Bed: [facility]  Expected date:   Expected time:   Means of arrival:   Comments:  #2     Brad Vences RN  09/14/21 1424

## 2021-09-15 LAB
EKG ATRIAL RATE: 96 BPM
EKG DIAGNOSIS: NORMAL
EKG P AXIS: 63 DEGREES
EKG P-R INTERVAL: 120 MS
EKG Q-T INTERVAL: 342 MS
EKG QRS DURATION: 70 MS
EKG QTC CALCULATION (BAZETT): 432 MS
EKG R AXIS: 34 DEGREES
EKG T AXIS: 12 DEGREES
EKG VENTRICULAR RATE: 96 BPM

## 2021-09-15 PROCEDURE — 93010 ELECTROCARDIOGRAM REPORT: CPT | Performed by: INTERNAL MEDICINE

## 2022-04-30 ENCOUNTER — HOSPITAL ENCOUNTER (INPATIENT)
Age: 27
LOS: 2 days | Discharge: HOME | DRG: 638 | End: 2022-05-02
Attending: INTERNAL MEDICINE | Admitting: INTERNAL MEDICINE
Payer: COMMERCIAL

## 2022-04-30 DIAGNOSIS — E11.10 DIABETIC KETOACIDOSIS WITHOUT COMA ASSOCIATED WITH TYPE 2 DIABETES MELLITUS (HCC): Primary | ICD-10-CM

## 2022-04-30 PROBLEM — B37.31 VAGINAL YEAST INFECTION: Status: ACTIVE | Noted: 2022-04-30

## 2022-04-30 PROBLEM — E87.29 HIGH ANION GAP METABOLIC ACIDOSIS: Status: ACTIVE | Noted: 2022-04-30

## 2022-04-30 PROBLEM — E11.9 NEW ONSET TYPE 2 DIABETES MELLITUS (HCC): Status: ACTIVE | Noted: 2022-04-30

## 2022-04-30 PROBLEM — E66.01 MORBID OBESITY DUE TO EXCESS CALORIES (HCC): Status: ACTIVE | Noted: 2022-04-30

## 2022-04-30 PROBLEM — R11.2 NAUSEA AND VOMITING: Status: ACTIVE | Noted: 2022-04-30

## 2022-04-30 PROBLEM — R00.0 SINUS TACHYCARDIA: Status: ACTIVE | Noted: 2022-04-30

## 2022-04-30 LAB
A/G RATIO: 0.8 (ref 1.1–2.2)
ALBUMIN SERPL-MCNC: 3.7 G/DL (ref 3.4–5)
ALP BLD-CCNC: 96 U/L (ref 40–129)
ALT SERPL-CCNC: 12 U/L (ref 10–40)
ANION GAP SERPL CALCULATED.3IONS-SCNC: 22 MMOL/L (ref 3–16)
ANION GAP SERPL CALCULATED.3IONS-SCNC: 24 MMOL/L (ref 3–16)
AST SERPL-CCNC: 13 U/L (ref 15–37)
BACTERIA: ABNORMAL /HPF
BASE EXCESS VENOUS: -12.2 MMOL/L
BASOPHILS ABSOLUTE: 0.1 K/UL (ref 0–0.2)
BASOPHILS RELATIVE PERCENT: 1 %
BETA-HYDROXYBUTYRATE: 5.77 MMOL/L (ref 0–0.27)
BILIRUB SERPL-MCNC: 0.3 MG/DL (ref 0–1)
BILIRUBIN URINE: NEGATIVE
BLOOD, URINE: ABNORMAL
BUN BLDV-MCNC: 6 MG/DL (ref 7–20)
BUN BLDV-MCNC: 7 MG/DL (ref 7–20)
CALCIUM SERPL-MCNC: 8.6 MG/DL (ref 8.3–10.6)
CALCIUM SERPL-MCNC: 8.8 MG/DL (ref 8.3–10.6)
CARBOXYHEMOGLOBIN: 1.2 %
CHLORIDE BLD-SCNC: 91 MMOL/L (ref 99–110)
CHLORIDE BLD-SCNC: 99 MMOL/L (ref 99–110)
CLARITY: CLEAR
CO2: 10 MMOL/L (ref 21–32)
CO2: 13 MMOL/L (ref 21–32)
COLOR: YELLOW
COMMENT UA: ABNORMAL
CREAT SERPL-MCNC: 0.7 MG/DL (ref 0.6–1.1)
CREAT SERPL-MCNC: 0.8 MG/DL (ref 0.6–1.1)
EOSINOPHILS ABSOLUTE: 0 K/UL (ref 0–0.6)
EOSINOPHILS RELATIVE PERCENT: 0.7 %
EPITHELIAL CELLS, UA: ABNORMAL /HPF (ref 0–5)
GFR AFRICAN AMERICAN: >60
GFR AFRICAN AMERICAN: >60
GFR NON-AFRICAN AMERICAN: >60
GFR NON-AFRICAN AMERICAN: >60
GLUCOSE BLD-MCNC: 107 MG/DL (ref 70–99)
GLUCOSE BLD-MCNC: 264 MG/DL (ref 70–99)
GLUCOSE BLD-MCNC: 324 MG/DL (ref 70–99)
GLUCOSE BLD-MCNC: 346 MG/DL (ref 70–99)
GLUCOSE BLD-MCNC: 387 MG/DL (ref 70–99)
GLUCOSE BLD-MCNC: 396 MG/DL (ref 70–99)
GLUCOSE BLD-MCNC: 495 MG/DL (ref 70–99)
GLUCOSE URINE: >=1000 MG/DL
HCG QUALITATIVE: NEGATIVE
HCO3 VENOUS: 14 MMOL/L (ref 23–29)
HCT VFR BLD CALC: 40.5 % (ref 36–48)
HEMOGLOBIN: 12.6 G/DL (ref 12–16)
KETONES, URINE: >=160 MG/DL
LEUKOCYTE ESTERASE, URINE: NEGATIVE
LIPASE: 23 U/L (ref 13–60)
LYMPHOCYTES ABSOLUTE: 1.6 K/UL (ref 1–5.1)
LYMPHOCYTES RELATIVE PERCENT: 26 %
MAGNESIUM: 2 MG/DL (ref 1.8–2.4)
MCH RBC QN AUTO: 23.5 PG (ref 26–34)
MCHC RBC AUTO-ENTMCNC: 31.2 G/DL (ref 31–36)
MCV RBC AUTO: 75.5 FL (ref 80–100)
METHEMOGLOBIN VENOUS: 0.6 %
MICROSCOPIC EXAMINATION: YES
MONOCYTES ABSOLUTE: 0.7 K/UL (ref 0–1.3)
MONOCYTES RELATIVE PERCENT: 11.4 %
MUCUS: ABNORMAL /LPF
NEUTROPHILS ABSOLUTE: 3.7 K/UL (ref 1.7–7.7)
NEUTROPHILS RELATIVE PERCENT: 60.9 %
NITRITE, URINE: NEGATIVE
O2 SAT, VEN: 74 %
O2 THERAPY: ABNORMAL
PCO2, VEN: 30.6 MMHG (ref 40–50)
PDW BLD-RTO: 18.9 % (ref 12.4–15.4)
PERFORMED ON: ABNORMAL
PH UA: 5 (ref 5–8)
PH VENOUS: 7.26 (ref 7.35–7.45)
PLATELET # BLD: 284 K/UL (ref 135–450)
PMV BLD AUTO: 9.1 FL (ref 5–10.5)
PO2, VEN: 44 MMHG
POTASSIUM REFLEX MAGNESIUM: 3.6 MMOL/L (ref 3.5–5.1)
POTASSIUM SERPL-SCNC: 4.1 MMOL/L (ref 3.5–5.1)
PROTEIN UA: 30 MG/DL
RBC # BLD: 5.37 M/UL (ref 4–5.2)
RBC UA: >100 /HPF (ref 0–4)
SODIUM BLD-SCNC: 126 MMOL/L (ref 136–145)
SODIUM BLD-SCNC: 133 MMOL/L (ref 136–145)
SPECIFIC GRAVITY UA: 1.04 (ref 1–1.03)
TCO2 CALC VENOUS: 15 MMOL/L
TOTAL PROTEIN: 8.2 G/DL (ref 6.4–8.2)
URINE REFLEX TO CULTURE: ABNORMAL
URINE TYPE: ABNORMAL
UROBILINOGEN, URINE: 0.2 E.U./DL
WBC # BLD: 6 K/UL (ref 4–11)
WBC UA: ABNORMAL /HPF (ref 0–5)

## 2022-04-30 PROCEDURE — 2580000003 HC RX 258: Performed by: INTERNAL MEDICINE

## 2022-04-30 PROCEDURE — 83036 HEMOGLOBIN GLYCOSYLATED A1C: CPT

## 2022-04-30 PROCEDURE — 2580000003 HC RX 258: Performed by: PHYSICIAN ASSISTANT

## 2022-04-30 PROCEDURE — 6360000002 HC RX W HCPCS: Performed by: INTERNAL MEDICINE

## 2022-04-30 PROCEDURE — 80053 COMPREHEN METABOLIC PANEL: CPT

## 2022-04-30 PROCEDURE — 6370000000 HC RX 637 (ALT 250 FOR IP): Performed by: PHYSICIAN ASSISTANT

## 2022-04-30 PROCEDURE — 81001 URINALYSIS AUTO W/SCOPE: CPT

## 2022-04-30 PROCEDURE — 36415 COLL VENOUS BLD VENIPUNCTURE: CPT

## 2022-04-30 PROCEDURE — 84703 CHORIONIC GONADOTROPIN ASSAY: CPT

## 2022-04-30 PROCEDURE — 6370000000 HC RX 637 (ALT 250 FOR IP): Performed by: INTERNAL MEDICINE

## 2022-04-30 PROCEDURE — C9113 INJ PANTOPRAZOLE SODIUM, VIA: HCPCS | Performed by: INTERNAL MEDICINE

## 2022-04-30 PROCEDURE — 96360 HYDRATION IV INFUSION INIT: CPT

## 2022-04-30 PROCEDURE — 85025 COMPLETE CBC W/AUTO DIFF WBC: CPT

## 2022-04-30 PROCEDURE — 83735 ASSAY OF MAGNESIUM: CPT

## 2022-04-30 PROCEDURE — 84100 ASSAY OF PHOSPHORUS: CPT

## 2022-04-30 PROCEDURE — 82803 BLOOD GASES ANY COMBINATION: CPT

## 2022-04-30 PROCEDURE — 83690 ASSAY OF LIPASE: CPT

## 2022-04-30 PROCEDURE — 99285 EMERGENCY DEPT VISIT HI MDM: CPT

## 2022-04-30 PROCEDURE — 82010 KETONE BODYS QUAN: CPT

## 2022-04-30 PROCEDURE — 2000000000 HC ICU R&B

## 2022-04-30 PROCEDURE — A4216 STERILE WATER/SALINE, 10 ML: HCPCS | Performed by: INTERNAL MEDICINE

## 2022-04-30 RX ORDER — POLYETHYLENE GLYCOL 3350 17 G/17G
17 POWDER, FOR SOLUTION ORAL DAILY PRN
Status: DISCONTINUED | OUTPATIENT
Start: 2022-04-30 | End: 2022-05-02 | Stop reason: HOSPADM

## 2022-04-30 RX ORDER — MAGNESIUM SULFATE 1 G/100ML
1000 INJECTION INTRAVENOUS PRN
Status: DISCONTINUED | OUTPATIENT
Start: 2022-04-30 | End: 2022-05-02 | Stop reason: HOSPADM

## 2022-04-30 RX ORDER — NICOTINE POLACRILEX 4 MG
15 LOZENGE BUCCAL PRN
Status: DISCONTINUED | OUTPATIENT
Start: 2022-04-30 | End: 2022-04-30

## 2022-04-30 RX ORDER — FLUCONAZOLE 100 MG/1
200 TABLET ORAL ONCE
Status: COMPLETED | OUTPATIENT
Start: 2022-04-30 | End: 2022-04-30

## 2022-04-30 RX ORDER — DEXTROSE MONOHYDRATE 25 G/50ML
12.5 INJECTION, SOLUTION INTRAVENOUS PRN
Status: DISCONTINUED | OUTPATIENT
Start: 2022-04-30 | End: 2022-04-30

## 2022-04-30 RX ORDER — ENOXAPARIN SODIUM 100 MG/ML
60 INJECTION SUBCUTANEOUS 2 TIMES DAILY
Status: DISCONTINUED | OUTPATIENT
Start: 2022-04-30 | End: 2022-05-02 | Stop reason: HOSPADM

## 2022-04-30 RX ORDER — LAMOTRIGINE 50 MG/1
150 TABLET, EXTENDED RELEASE ORAL DAILY
Status: DISCONTINUED | OUTPATIENT
Start: 2022-05-01 | End: 2022-05-01

## 2022-04-30 RX ORDER — SODIUM CHLORIDE 9 MG/ML
INJECTION, SOLUTION INTRAVENOUS CONTINUOUS
Status: DISCONTINUED | OUTPATIENT
Start: 2022-05-01 | End: 2022-05-02 | Stop reason: HOSPADM

## 2022-04-30 RX ORDER — POTASSIUM CHLORIDE 20 MEQ/1
40 TABLET, EXTENDED RELEASE ORAL ONCE
Status: COMPLETED | OUTPATIENT
Start: 2022-04-30 | End: 2022-04-30

## 2022-04-30 RX ORDER — 0.9 % SODIUM CHLORIDE 0.9 %
1000 INTRAVENOUS SOLUTION INTRAVENOUS ONCE
Status: COMPLETED | OUTPATIENT
Start: 2022-04-30 | End: 2022-04-30

## 2022-04-30 RX ORDER — ESCITALOPRAM OXALATE 10 MG/1
10 TABLET ORAL DAILY
Status: DISCONTINUED | OUTPATIENT
Start: 2022-05-01 | End: 2022-05-01

## 2022-04-30 RX ORDER — HYDROCODONE BITARTRATE AND ACETAMINOPHEN 5; 325 MG/1; MG/1
1 TABLET ORAL ONCE
Status: COMPLETED | OUTPATIENT
Start: 2022-04-30 | End: 2022-04-30

## 2022-04-30 RX ORDER — 0.9 % SODIUM CHLORIDE 0.9 %
15 INTRAVENOUS SOLUTION INTRAVENOUS ONCE
Status: DISCONTINUED | OUTPATIENT
Start: 2022-04-30 | End: 2022-05-02 | Stop reason: HOSPADM

## 2022-04-30 RX ORDER — DEXTROSE AND SODIUM CHLORIDE 5; .45 G/100ML; G/100ML
INJECTION, SOLUTION INTRAVENOUS CONTINUOUS PRN
Status: DISCONTINUED | OUTPATIENT
Start: 2022-04-30 | End: 2022-05-01

## 2022-04-30 RX ORDER — DEXTROSE MONOHYDRATE 25 G/50ML
12.5 INJECTION, SOLUTION INTRAVENOUS PRN
Status: DISCONTINUED | OUTPATIENT
Start: 2022-04-30 | End: 2022-05-02 | Stop reason: HOSPADM

## 2022-04-30 RX ORDER — POTASSIUM CHLORIDE 7.45 MG/ML
10 INJECTION INTRAVENOUS PRN
Status: DISCONTINUED | OUTPATIENT
Start: 2022-04-30 | End: 2022-05-01

## 2022-04-30 RX ORDER — DEXTROSE MONOHYDRATE 50 MG/ML
100 INJECTION, SOLUTION INTRAVENOUS PRN
Status: DISCONTINUED | OUTPATIENT
Start: 2022-04-30 | End: 2022-04-30

## 2022-04-30 RX ADMIN — POTASSIUM CHLORIDE 40 MEQ: 20 TABLET, EXTENDED RELEASE ORAL at 21:04

## 2022-04-30 RX ADMIN — SODIUM CHLORIDE 1000 ML: 9 INJECTION, SOLUTION INTRAVENOUS at 19:28

## 2022-04-30 RX ADMIN — SODIUM CHLORIDE, PRESERVATIVE FREE 40 MG: 5 INJECTION INTRAVENOUS at 21:04

## 2022-04-30 RX ADMIN — SODIUM CHLORIDE 1000 ML: 9 INJECTION, SOLUTION INTRAVENOUS at 19:16

## 2022-04-30 RX ADMIN — POTASSIUM CHLORIDE 10 MEQ: 7.46 INJECTION, SOLUTION INTRAVENOUS at 23:54

## 2022-04-30 RX ADMIN — FLUCONAZOLE 200 MG: 100 TABLET ORAL at 21:04

## 2022-04-30 RX ADMIN — DEXTROSE AND SODIUM CHLORIDE: 5; 450 INJECTION, SOLUTION INTRAVENOUS at 23:44

## 2022-04-30 RX ADMIN — SODIUM CHLORIDE: 9 INJECTION, SOLUTION INTRAVENOUS at 22:29

## 2022-04-30 RX ADMIN — HYDROCODONE BITARTRATE AND ACETAMINOPHEN 1 TABLET: 5; 325 TABLET ORAL at 22:35

## 2022-04-30 RX ADMIN — CEFTRIAXONE 1000 MG: 1 INJECTION, POWDER, FOR SOLUTION INTRAMUSCULAR; INTRAVENOUS at 21:49

## 2022-04-30 RX ADMIN — SODIUM CHLORIDE 19.5 UNITS/HR: 9 INJECTION, SOLUTION INTRAVENOUS at 21:20

## 2022-04-30 ASSESSMENT — ENCOUNTER SYMPTOMS
ABDOMINAL PAIN: 0
BACK PAIN: 0
COLOR CHANGE: 0
SHORTNESS OF BREATH: 0
NAUSEA: 1
DIARRHEA: 0
VOMITING: 0

## 2022-04-30 ASSESSMENT — LIFESTYLE VARIABLES
HOW OFTEN DO YOU HAVE A DRINK CONTAINING ALCOHOL: NEVER
HOW MANY STANDARD DRINKS CONTAINING ALCOHOL DO YOU HAVE ON A TYPICAL DAY: 1 OR 2

## 2022-04-30 ASSESSMENT — PAIN DESCRIPTION - ORIENTATION: ORIENTATION: RIGHT;LEFT

## 2022-04-30 ASSESSMENT — PAIN SCALES - GENERAL
PAINLEVEL_OUTOF10: 7
PAINLEVEL_OUTOF10: 7
PAINLEVEL_OUTOF10: 3

## 2022-04-30 ASSESSMENT — PAIN DESCRIPTION - LOCATION: LOCATION: LEG

## 2022-04-30 ASSESSMENT — PAIN - FUNCTIONAL ASSESSMENT: PAIN_FUNCTIONAL_ASSESSMENT: ACTIVITIES ARE NOT PREVENTED

## 2022-04-30 ASSESSMENT — PAIN DESCRIPTION - FREQUENCY: FREQUENCY: INTERMITTENT

## 2022-04-30 ASSESSMENT — PAIN DESCRIPTION - ONSET: ONSET: ON-GOING

## 2022-04-30 ASSESSMENT — PAIN DESCRIPTION - PAIN TYPE: TYPE: ACUTE PAIN

## 2022-04-30 ASSESSMENT — PAIN DESCRIPTION - DESCRIPTORS: DESCRIPTORS: CRAMPING

## 2022-04-30 NOTE — ED PROVIDER NOTES
[address] [facility]      Pt Name: Sindy Syed  MRN: [id number]  [place] 1995  Date of evaluation: 4/30/2022  Provider: ELENA Ramachandran    This patient was not seen and evaluated by the attending physician No att. providers found. CHIEF COMPLAINT       Chief Complaint   Patient presents with    Blood Sugar Problem     Patient arrives via walk in with c/o High BS. Patient was seen at [facility]. Patient states prediabetic previously. Pt with excessive thirst and urination. CRITICAL CARE TIME   I performed a total Critical Care time of 31 minutes, excluding separately reportable procedures. There was a high probability of clinically significant/life threatening deterioration in the patient's condition which required my urgent intervention. Not limited to multiple reexaminations, discussions with attending physician and consultants. HISTORY OF PRESENT ILLNESS  (Location/Symptom, Timing/Onset, Context/Setting, Quality, Duration, Modifying Factors, Severity.)   Sindy Syed is a 33 y.o. female who presents to the emergency department after being instructed to come by the [facility]. She states that she was seen at the [facility] because for the past 3-week she has had increased thirst, urination, diagnosed with a urinary tract infection and yeast infection which she states she has not had in her adult life. They found her blood sugar to be elevated over 300. She states had been previously told she was \"prediabetic. \"  But her mom and dad both have diabetes. She is currently taking an antibiotic for urinary tract infection and Flagyl. She complains of some nausea no vomiting. No abdominal pain or chest pain. No other chronic known medical problems. Her A1c had been found to be greater than 11 at the [facility]. Nursing Notes were reviewed and I agree.     REVIEW OF SYSTEMS    (2-9 systems for level 4, 10 or more for level 5)     Review of Systems   Constitutional: Negative for fever. Respiratory: Negative for shortness of breath. Cardiovascular: Negative for chest pain. Gastrointestinal: Positive for nausea. Negative for abdominal pain, diarrhea and vomiting. Endocrine: Positive for polydipsia and polyuria. Genitourinary: Positive for frequency. Musculoskeletal: Negative for back pain. Skin: Negative for color change, rash and wound. Psychiatric/Behavioral: Negative for agitation, behavioral problems and confusion. Except as noted above the remainder of the review of systems was reviewed and negative. PAST MEDICAL HISTORY         Diagnosis Date    H/O absence seizures 9/13/2019    Influenza A 02/07/2020       SURGICAL HISTORY     No past surgical history on file. CURRENT MEDICATIONS       Previous Medications    ACETAMINOPHEN (APAP EXTRA STRENGTH) 500 MG TABLET    Take 1 tablet by mouth every 6 hours as needed for Pain    ESCITALOPRAM (LEXAPRO) 10 MG TABLET    Take 1 tablet by mouth daily    FERROUS SULFATE (IRON 325) 325 (65 FE) MG TABLET    Take 1 tablet by mouth daily (with breakfast)    FESOTERODINE FUMARATE (TOVIAZ PO)    Take by mouth    IBUPROFEN (ADVIL;MOTRIN) 800 MG TABLET    Take 1 tablet by mouth every 8 hours as needed for Pain    LAMOTRIGINE (LAMICTAL XR) 50 MG EXTENDED RELEASE TABLET    Take 150 mg by mouth daily       ALLERGIES     Patient has no known allergies. FAMILY HISTORY           Problem Relation Age of Onset    Diabetes type 2  Mother      Family Status   Relation Name Status    Mother  Alive    Father  Alive    Sister  Alive    Brother  Alive        SOCIAL HISTORY      reports that she has never smoked. She has never used smokeless tobacco. She reports previous alcohol use. She reports that she does not use drugs.     PHYSICAL EXAM    (up to 7 for level 4, 8 or more for level 5)     ED Triage Vitals [04/30/22 1721]   BP Temp Temp Source Pulse Resp SpO2 Height Weight   (!) 142/97 97.8 °F (36.6 °C) Temporal 112 20 100 % -- (!) 430 lb 8.9 oz (195.3 kg)       Physical Exam  Vitals reviewed. Constitutional:       Appearance: Normal appearance. She is obese. HENT:      Head: Normocephalic and atraumatic. Mouth/Throat:      Mouth: Mucous membranes are dry. Eyes:      Pupils: Pupils are equal, round, and reactive to light. Cardiovascular:      Rate and Rhythm: Tachycardia present. Pulmonary:      Effort: Pulmonary effort is normal. No respiratory distress. Abdominal:      Tenderness: There is no abdominal tenderness. There is no guarding or rebound. Musculoskeletal:         General: Normal range of motion. Cervical back: Normal range of motion. Skin:     General: Skin is warm. Neurological:      General: No focal deficit present. Mental Status: She is alert and oriented to person, place, and time.    Psychiatric:         Mood and Affect: Mood normal.         Behavior: Behavior normal.         DIAGNOSTIC RESULTS     NONE    LABS:  Labs Reviewed   CBC WITH AUTO DIFFERENTIAL - Abnormal; Notable for the following components:       Result Value    RBC 5.37 (*)     MCV 75.5 (*)     MCH 23.5 (*)     RDW 18.9 (*)     All other components within normal limits   COMPREHENSIVE METABOLIC PANEL - Abnormal; Notable for the following components:    Sodium 126 (*)     Chloride 91 (*)     CO2 13 (*)     Anion Gap 22 (*)     Glucose 495 (*)     Albumin/Globulin Ratio 0.8 (*)     AST 13 (*)     All other components within normal limits    Narrative:     Januarykatlyn Valdes tel. [phone],  Chemistry results called to and read back by Payton Cisse RN, 04/30/2022  19:59, by [address] [place] [address] CULTURE - Abnormal; Notable for the following components:    Glucose, Ur >=1000 (*)     Ketones, Urine >=160 (*)     Protein, UA 30 (*)     All other components within normal limits   BLOOD GAS, VENOUS - Abnormal; Notable for the following components:    pH, Nicolas 7.256 (*)     pCO2, Nicolas 30.6 (*)     HCO3, Venous 14 (*)     All other components within normal limits   BETA-HYDROXYBUTYRATE - Abnormal; Notable for the following components:    Beta-Hydroxybutyrate 5.77 (*)     All other components within normal limits    Narrative:     [address],  Chemistry results called to and read back by Gertrude Dudley RN, 04/30/2022  19:59, by Steve Ashraf   POCT GLUCOSE - Abnormal; Notable for the following components:    POC Glucose 387 (*)     All other components within normal limits   MAGNESIUM    Narrative:     Tulio [place]freyland tel. [phone],  Chemistry results called to and read back by Gertrude Dudley RN, 04/30/2022  19:59, by Steve Ashraf   HCG, SERUM, QUALITATIVE   LIPASE    Narrative:     Tulio [place] tel. [phone],  Chemistry results called to and read back by Gertrude Dudley RN, 04/30/2022  19:59, by [address]   POCT GLUCOSE   POCT GLUCOSE   POCT GLUCOSE   POCT GLUCOSE   POCT GLUCOSE   POCT GLUCOSE   POCT GLUCOSE   POCT GLUCOSE   POCT GLUCOSE   POCT GLUCOSE   POCT GLUCOSE   POCT GLUCOSE   POCT GLUCOSE       All other labs were within normal range or not returned as of this dictation. EMERGENCY DEPARTMENT COURSE and DIFFERENTIAL DIAGNOSIS/MDM:   Vitals:    Vitals:    04/30/22 1721   BP: (!) 142/97   Pulse: 112   Resp: 20   Temp: 97.8 °F (36.6 °C)   TempSrc: Temporal   SpO2: 100%   Weight: (!) 430 lb 8.9 oz (195.3 kg)     Patient initially tachycardic to 112. She is not febrile she has no abdominal tenderness no cough no shortness of breath. She is in DKA. Given several liters of fluid in the emergency department and started on an insulin drip. Given oral potassium in the emergency department. Potassium 4.1. Anion gap was 22 with a CO2 of 13 and a pH of 7.2. Urine does not show evidence of infection. White count 6. Discussed with patient and  results and findings of DKA in severity.   She is agreeable to admission and I will consult the hospitalist.    CONSULTS:  IP CONSULT TO HOSPITALIST    PROCEDURES:  Procedures      FINAL IMPRESSION      1. Diabetic ketoacidosis without coma associated with type 2 diabetes mellitus ([facility] [address]          DISPOSITION/PLAN   DISPOSITION Decision To Admit 04/30/2022 08:26:37 PM      PATIENT REFERRED TO:  No follow-up provider specified.     DISCHARGE MEDICATIONS:  New Prescriptions    No medications on file       (Please note that portions of this note were completed with a voice recognition program.  Efforts were made to edit the dictations but occasionally words are mis-transcribed.)    Ava Mix, [address], [address]  04/30/22 2055

## 2022-04-30 NOTE — ED NOTES
Pt walked to the bathroom to leave a urine and walked back with no problem     Annia Courtney  04/30/22 1949

## 2022-05-01 PROBLEM — K21.9 GERD (GASTROESOPHAGEAL REFLUX DISEASE): Status: ACTIVE | Noted: 2022-05-01

## 2022-05-01 LAB
ANION GAP SERPL CALCULATED.3IONS-SCNC: 14 MMOL/L (ref 3–16)
ANION GAP SERPL CALCULATED.3IONS-SCNC: 14 MMOL/L (ref 3–16)
ANION GAP SERPL CALCULATED.3IONS-SCNC: 17 MMOL/L (ref 3–16)
ANION GAP SERPL CALCULATED.3IONS-SCNC: 22 MMOL/L (ref 3–16)
BUN BLDV-MCNC: 3 MG/DL (ref 7–20)
BUN BLDV-MCNC: 4 MG/DL (ref 7–20)
BUN BLDV-MCNC: 5 MG/DL (ref 7–20)
BUN BLDV-MCNC: 7 MG/DL (ref 7–20)
CALCIUM SERPL-MCNC: 8.5 MG/DL (ref 8.3–10.6)
CALCIUM SERPL-MCNC: 8.9 MG/DL (ref 8.3–10.6)
CALCIUM SERPL-MCNC: 9 MG/DL (ref 8.3–10.6)
CALCIUM SERPL-MCNC: 9.3 MG/DL (ref 8.3–10.6)
CHLORIDE BLD-SCNC: 102 MMOL/L (ref 99–110)
CHLORIDE BLD-SCNC: 103 MMOL/L (ref 99–110)
CHLORIDE BLD-SCNC: 104 MMOL/L (ref 99–110)
CHLORIDE BLD-SCNC: 105 MMOL/L (ref 99–110)
CO2: 11 MMOL/L (ref 21–32)
CO2: 12 MMOL/L (ref 21–32)
CO2: 15 MMOL/L (ref 21–32)
CO2: 18 MMOL/L (ref 21–32)
CREAT SERPL-MCNC: 0.6 MG/DL (ref 0.6–1.1)
CREAT SERPL-MCNC: 0.6 MG/DL (ref 0.6–1.1)
CREAT SERPL-MCNC: 0.7 MG/DL (ref 0.6–1.1)
CREAT SERPL-MCNC: 0.8 MG/DL (ref 0.6–1.1)
ESTIMATED AVERAGE GLUCOSE: 283.4 MG/DL
GFR AFRICAN AMERICAN: >60
GFR NON-AFRICAN AMERICAN: >60
GLUCOSE BLD-MCNC: 110 MG/DL (ref 70–99)
GLUCOSE BLD-MCNC: 116 MG/DL (ref 70–99)
GLUCOSE BLD-MCNC: 143 MG/DL (ref 70–99)
GLUCOSE BLD-MCNC: 151 MG/DL (ref 70–99)
GLUCOSE BLD-MCNC: 156 MG/DL (ref 70–99)
GLUCOSE BLD-MCNC: 157 MG/DL (ref 70–99)
GLUCOSE BLD-MCNC: 168 MG/DL (ref 70–99)
GLUCOSE BLD-MCNC: 174 MG/DL (ref 70–99)
GLUCOSE BLD-MCNC: 176 MG/DL (ref 70–99)
GLUCOSE BLD-MCNC: 179 MG/DL (ref 70–99)
GLUCOSE BLD-MCNC: 188 MG/DL (ref 70–99)
GLUCOSE BLD-MCNC: 194 MG/DL (ref 70–99)
GLUCOSE BLD-MCNC: 204 MG/DL (ref 70–99)
GLUCOSE BLD-MCNC: 216 MG/DL (ref 70–99)
GLUCOSE BLD-MCNC: 217 MG/DL (ref 70–99)
GLUCOSE BLD-MCNC: 219 MG/DL (ref 70–99)
GLUCOSE BLD-MCNC: 219 MG/DL (ref 70–99)
GLUCOSE BLD-MCNC: 327 MG/DL (ref 70–99)
GLUCOSE BLD-MCNC: 346 MG/DL (ref 70–99)
HBA1C MFR BLD: 11.5 %
MAGNESIUM: 1.8 MG/DL (ref 1.8–2.4)
MAGNESIUM: 1.9 MG/DL (ref 1.8–2.4)
MAGNESIUM: 1.9 MG/DL (ref 1.8–2.4)
MAGNESIUM: 2 MG/DL (ref 1.8–2.4)
PERFORMED ON: ABNORMAL
PHOSPHORUS: 2.3 MG/DL (ref 2.5–4.9)
PHOSPHORUS: 2.3 MG/DL (ref 2.5–4.9)
PHOSPHORUS: 2.8 MG/DL (ref 2.5–4.9)
PHOSPHORUS: 3.9 MG/DL (ref 2.5–4.9)
POTASSIUM SERPL-SCNC: 3.6 MMOL/L (ref 3.5–5.1)
POTASSIUM SERPL-SCNC: 3.6 MMOL/L (ref 3.5–5.1)
POTASSIUM SERPL-SCNC: 3.8 MMOL/L (ref 3.5–5.1)
POTASSIUM SERPL-SCNC: 4.3 MMOL/L (ref 3.5–5.1)
SODIUM BLD-SCNC: 131 MMOL/L (ref 136–145)
SODIUM BLD-SCNC: 134 MMOL/L (ref 136–145)
SODIUM BLD-SCNC: 136 MMOL/L (ref 136–145)
SODIUM BLD-SCNC: 136 MMOL/L (ref 136–145)

## 2022-05-01 PROCEDURE — 84100 ASSAY OF PHOSPHORUS: CPT

## 2022-05-01 PROCEDURE — 1200000000 HC SEMI PRIVATE

## 2022-05-01 PROCEDURE — 36415 COLL VENOUS BLD VENIPUNCTURE: CPT

## 2022-05-01 PROCEDURE — 2580000003 HC RX 258: Performed by: INTERNAL MEDICINE

## 2022-05-01 PROCEDURE — 86341 ISLET CELL ANTIBODY: CPT

## 2022-05-01 PROCEDURE — 6370000000 HC RX 637 (ALT 250 FOR IP): Performed by: INTERNAL MEDICINE

## 2022-05-01 PROCEDURE — 2500000003 HC RX 250 WO HCPCS: Performed by: INTERNAL MEDICINE

## 2022-05-01 PROCEDURE — 6360000002 HC RX W HCPCS: Performed by: INTERNAL MEDICINE

## 2022-05-01 PROCEDURE — 80048 BASIC METABOLIC PNL TOTAL CA: CPT

## 2022-05-01 PROCEDURE — 6360000002 HC RX W HCPCS: Performed by: STUDENT IN AN ORGANIZED HEALTH CARE EDUCATION/TRAINING PROGRAM

## 2022-05-01 PROCEDURE — C9113 INJ PANTOPRAZOLE SODIUM, VIA: HCPCS | Performed by: INTERNAL MEDICINE

## 2022-05-01 PROCEDURE — 6370000000 HC RX 637 (ALT 250 FOR IP): Performed by: STUDENT IN AN ORGANIZED HEALTH CARE EDUCATION/TRAINING PROGRAM

## 2022-05-01 PROCEDURE — 83735 ASSAY OF MAGNESIUM: CPT

## 2022-05-01 RX ORDER — DEXTROSE MONOHYDRATE 25 G/50ML
12.5 INJECTION, SOLUTION INTRAVENOUS PRN
Status: DISCONTINUED | OUTPATIENT
Start: 2022-05-01 | End: 2022-05-01

## 2022-05-01 RX ORDER — DEXTROSE MONOHYDRATE 25 G/50ML
12.5 INJECTION, SOLUTION INTRAVENOUS PRN
Status: DISCONTINUED | OUTPATIENT
Start: 2022-05-01 | End: 2022-05-02 | Stop reason: HOSPADM

## 2022-05-01 RX ORDER — DEXTROSE MONOHYDRATE 50 MG/ML
100 INJECTION, SOLUTION INTRAVENOUS PRN
Status: DISCONTINUED | OUTPATIENT
Start: 2022-05-01 | End: 2022-05-01

## 2022-05-01 RX ORDER — INSULIN GLARGINE 100 [IU]/ML
28 INJECTION, SOLUTION SUBCUTANEOUS NIGHTLY
Status: DISCONTINUED | OUTPATIENT
Start: 2022-05-02 | End: 2022-05-02 | Stop reason: HOSPADM

## 2022-05-01 RX ORDER — NICOTINE POLACRILEX 4 MG
15 LOZENGE BUCCAL PRN
Status: DISCONTINUED | OUTPATIENT
Start: 2022-05-01 | End: 2022-05-02 | Stop reason: HOSPADM

## 2022-05-01 RX ORDER — NICOTINE POLACRILEX 4 MG
15 LOZENGE BUCCAL PRN
Status: DISCONTINUED | OUTPATIENT
Start: 2022-05-01 | End: 2022-05-01

## 2022-05-01 RX ORDER — INSULIN GLARGINE 100 [IU]/ML
20 INJECTION, SOLUTION SUBCUTANEOUS NIGHTLY
Status: DISCONTINUED | OUTPATIENT
Start: 2022-05-01 | End: 2022-05-01

## 2022-05-01 RX ORDER — INSULIN GLARGINE 100 [IU]/ML
8 INJECTION, SOLUTION SUBCUTANEOUS ONCE
Status: COMPLETED | OUTPATIENT
Start: 2022-05-01 | End: 2022-05-01

## 2022-05-01 RX ORDER — INSULIN LISPRO 100 [IU]/ML
0-18 INJECTION, SOLUTION INTRAVENOUS; SUBCUTANEOUS
Status: DISCONTINUED | OUTPATIENT
Start: 2022-05-01 | End: 2022-05-02 | Stop reason: HOSPADM

## 2022-05-01 RX ORDER — ONDANSETRON 2 MG/ML
4 INJECTION INTRAMUSCULAR; INTRAVENOUS EVERY 6 HOURS PRN
Status: DISCONTINUED | OUTPATIENT
Start: 2022-05-01 | End: 2022-05-02 | Stop reason: HOSPADM

## 2022-05-01 RX ORDER — DEXTROSE MONOHYDRATE 50 MG/ML
100 INJECTION, SOLUTION INTRAVENOUS PRN
Status: DISCONTINUED | OUTPATIENT
Start: 2022-05-01 | End: 2022-05-02 | Stop reason: HOSPADM

## 2022-05-01 RX ORDER — INSULIN LISPRO 100 [IU]/ML
0-9 INJECTION, SOLUTION INTRAVENOUS; SUBCUTANEOUS NIGHTLY
Status: DISCONTINUED | OUTPATIENT
Start: 2022-05-01 | End: 2022-05-02 | Stop reason: HOSPADM

## 2022-05-01 RX ADMIN — INSULIN GLARGINE 20 UNITS: 100 INJECTION, SOLUTION SUBCUTANEOUS at 12:28

## 2022-05-01 RX ADMIN — POTASSIUM CHLORIDE 10 MEQ: 7.46 INJECTION, SOLUTION INTRAVENOUS at 12:30

## 2022-05-01 RX ADMIN — POTASSIUM CHLORIDE 10 MEQ: 7.46 INJECTION, SOLUTION INTRAVENOUS at 09:37

## 2022-05-01 RX ADMIN — INSULIN LISPRO 12 UNITS: 100 INJECTION, SOLUTION INTRAVENOUS; SUBCUTANEOUS at 18:44

## 2022-05-01 RX ADMIN — POTASSIUM CHLORIDE 10 MEQ: 7.46 INJECTION, SOLUTION INTRAVENOUS at 01:58

## 2022-05-01 RX ADMIN — INSULIN GLARGINE 8 UNITS: 100 INJECTION, SOLUTION SUBCUTANEOUS at 21:49

## 2022-05-01 RX ADMIN — POTASSIUM CHLORIDE 10 MEQ: 7.46 INJECTION, SOLUTION INTRAVENOUS at 00:56

## 2022-05-01 RX ADMIN — ENOXAPARIN SODIUM 60 MG: 100 INJECTION SUBCUTANEOUS at 08:46

## 2022-05-01 RX ADMIN — POTASSIUM CHLORIDE 10 MEQ: 7.46 INJECTION, SOLUTION INTRAVENOUS at 11:13

## 2022-05-01 RX ADMIN — CEFTRIAXONE 1000 MG: 1 INJECTION, POWDER, FOR SOLUTION INTRAMUSCULAR; INTRAVENOUS at 22:03

## 2022-05-01 RX ADMIN — DEXTROSE AND SODIUM CHLORIDE: 5; 450 INJECTION, SOLUTION INTRAVENOUS at 06:38

## 2022-05-01 RX ADMIN — INSULIN LISPRO 3 UNITS: 100 INJECTION, SOLUTION INTRAVENOUS; SUBCUTANEOUS at 14:11

## 2022-05-01 RX ADMIN — SODIUM CHLORIDE, PRESERVATIVE FREE 40 MG: 5 INJECTION INTRAVENOUS at 21:49

## 2022-05-01 RX ADMIN — ENOXAPARIN SODIUM 60 MG: 100 INJECTION SUBCUTANEOUS at 21:49

## 2022-05-01 RX ADMIN — POTASSIUM CHLORIDE 10 MEQ: 7.46 INJECTION, SOLUTION INTRAVENOUS at 05:48

## 2022-05-01 RX ADMIN — POTASSIUM CHLORIDE 10 MEQ: 7.46 INJECTION, SOLUTION INTRAVENOUS at 06:50

## 2022-05-01 RX ADMIN — INSULIN LISPRO 6 UNITS: 100 INJECTION, SOLUTION INTRAVENOUS; SUBCUTANEOUS at 21:49

## 2022-05-01 RX ADMIN — ONDANSETRON 4 MG: 2 INJECTION INTRAMUSCULAR; INTRAVENOUS at 08:46

## 2022-05-01 RX ADMIN — DEXTROSE AND SODIUM CHLORIDE: 5; 450 INJECTION, SOLUTION INTRAVENOUS at 13:53

## 2022-05-01 RX ADMIN — SODIUM CHLORIDE 4.98 UNITS/HR: 9 INJECTION, SOLUTION INTRAVENOUS at 08:36

## 2022-05-01 RX ADMIN — SODIUM CHLORIDE, PRESERVATIVE FREE 40 MG: 5 INJECTION INTRAVENOUS at 08:45

## 2022-05-01 RX ADMIN — SODIUM PHOSPHATE, MONOBASIC, MONOHYDRATE 10 MMOL: 276; 142 INJECTION, SOLUTION INTRAVENOUS at 01:46

## 2022-05-01 NOTE — PROGRESS NOTES
4 Eyes Skin Assessment     NAME:  John Brown  YOB: 1995  MEDICAL RECORD NUMBER:  [id number]    The patient is being assess for  Admission    I agree that 2 RN's have performed a thorough Head to Toe Skin Assessment on the patient. ALL assessment sites listed below have been assessed. Areas assessed by both nurses:    Head, Face, Ears, Shoulders, Back, Chest, Arms, Elbows, Hands, Sacrum. Buttock, Coccyx, Ischium and Legs. Feet and Heels        Does the Patient have a Wound?  No noted wound(s)       Sandro Prevention initiated:  No   Wound Care Orders initiated:  No    Pressure Injury (Stage 3,4, Unstageable, DTI, NWPT, and Complex wounds) if present place consult order under [de-identified] No    New and Established Ostomies if present place consult order under : No      Nurse 1 eSignature: Electronically signed by Stef Montalvo RN on 4/30/22 at 10:52 PM EDT    **SHARE this note so that the co-signing nurse is able to place an eSignature**    Nurse 2 eSignature: {Esignature:[id number]}

## 2022-05-01 NOTE — PROGRESS NOTES
Hospitalist Progress Note      PCP: Kacey Shah DO    Date of Admission: 4/30/2022    Chief Complaint: polydipsia and polyuria    Hospital Course:    33y.o. year-old female with a history of \"pre-diabetes\" and morbid obesity who presents to the emergency room for evaluation following a 3-week history of increased urination, increased thirst and a vaginal yeast infection. She was seen at the [facility] clinic and found to have a glucose level in the 300s and a HgbA1c of over 11. She was instructed to come to the emergency room. In the emergency room she was found to be in DKA. Subjective:   Reports generalized fatigue, continues to be on insulin drip. No new complaints, vitally stable afebrile      Medications:  Reviewed    Infusion Medications    dextrose      insulin      sodium chloride Stopped (04/30/22 2339)    dextrose 5 % and 0.45 % NaCl 150 mL/hr at 05/01/22 0700     Scheduled Medications    pantoprazole (PROTONIX) 40 mg injection  40 mg IntraVENous Q12H    enoxaparin  60 mg SubCUTAneous BID    sodium chloride  15 mL/kg IntraVENous Once    cefTRIAXone (ROCEPHIN) IV  1,000 mg IntraVENous Q24H     PRN Meds: glucose, dextrose, glucagon (rDNA), dextrose, dextrose, potassium chloride, magnesium sulfate, sodium phosphate IVPB **OR** sodium phosphate IVPB **OR** sodium phosphate IVPB, polyethylene glycol, dextrose 5 % and 0.45 % NaCl      Intake/Output Summary (Last 24 hours) at 5/1/2022 0732  Last data filed at 5/1/2022 0700  Gross per 24 hour   Intake 2148.41 ml   Output --   Net 2148.41 ml       Physical Exam Performed:    BP (!) 98/53   Pulse 83   Temp 98.9 °F (37.2 °C) (Oral)   Resp 18   Ht 5' 4\" (1.626 m)   Wt (!) 435 lb 10.1 oz (197.6 kg)   LMP 04/30/2022   SpO2 96%   BMI 74.78 kg/m²     General appearance: Morbidly obese female patient   HEENT: Pupils equal, round, and reactive to light. Conjunctivae/corneas clear. Neck: Supple, with full range of motion.  No jugular venous distention. Trachea midline. Respiratory:  Normal respiratory effort. Clear to auscultation, bilaterally without Rales/Wheezes/Rhonchi. Cardiovascular: Regular rate and rhythm with normal S1/S2 without murmurs, rubs or gallops. Abdomen: Soft, non-tender, non-distended with normal bowel sounds. Musculoskeletal: No clubbing, cyanosis or edema bilaterally. Full range of motion without deformity. Skin: Skin color, texture, turgor normal.  No rashes or lesions. Neurologic:  Neurovascularly intact without any focal sensory/motor deficits. Cranial nerves: II-XII intact, grossly non-focal.  Psychiatric: Alert and oriented, thought content appropriate, normal insight  Capillary Refill: Brisk,3 seconds, normal   Peripheral Pulses: +2 palpable, equal bilaterally       Labs:   Recent Labs     04/30/22  1859   WBC 6.0   HGB 12.6   HCT 40.5        Recent Labs     04/30/22  2200 04/30/22  2342 05/01/22  0408   * 136 131*   K 3.6 3.6 4.3   CL 99 103 102   CO2 10* 11* 12*   BUN 6* 7 5*   CREATININE 0.8 0.8 0.7   CALCIUM 8.6 9.3 9.0   PHOS  --  2.3* 3.9     Recent Labs     04/30/22  1859   AST 13*   ALT 12   BILITOT 0.3   ALKPHOS 96     No results for input(s): INR in the last 72 hours. No results for input(s): Manuel Beery in the last 72 hours.     Urinalysis:      Lab Results   Component Value Date    NITRU Negative 04/30/2022    WBCUA 3-5 04/30/2022    BACTERIA 4+ 04/30/2022    RBCUA >100 04/30/2022    BLOODU Large 04/30/2022    SPECGRAV 1.040 04/30/2022    GLUCOSEU >=1000 04/30/2022       Radiology:  No orders to display           Assessment/Plan:    Active Hospital Problems    Diagnosis     GERD (gastroesophageal reflux disease) [K21.9]      Priority: Medium    Diabetic ketoacidosis without coma associated with type 2 diabetes mellitus ([facility] [address] [E11.10]      Priority: Medium    New onset type 2 diabetes mellitus ([facility] [address] [E11.9]      Priority: Medium    Nausea and vomiting [R11.2]      Priority: Medium  Vaginal yeast infection [B37.3]      Priority: Medium    Morbid obesity due to excess calories (HCC) [E66.01]      Priority: Medium    Sinus tachycardia [R00.0]      Priority: Medium    High anion gap metabolic acidosis [I35.5]      Priority: Medium     Diabetic ketoacidosis. Newly diagnosed diabetes mellitus. Anion gap metabolic acidosis  Patient had initially presented with polyuria and polydipsia to urgent care, was found to be in DKA, patient was not known to be diabetic. A1c at outside clinic was found to be 11.9, anti TREASURE ordered to further specify type of diabetes, however given high A1c patient will need to start on insulin either way. Patient initially started on insulin infusion, on DKA protocol  Gap closed  Plan.   -We will transition to insulin, glargine 10 with high intensity sliding scale, will adjust according to blood sugar.    -Diabetic educator consult.  -Follow pending labs    Vaginal yeast infection. Was given Diflucan. Nausea and vomiting. Improved, likely secondary to DKA    Morbid obesity, BMI 73. Complicating course. GERD. Continue pantoprazole    Suspected UTI. Started on ceftriaxone    DVT Prophylaxis: lovenox  Diet: Diet NPO Exceptions are: Ice Chips, Sips of Water with Meds, Sips of Clear Liquids  Code Status: Full Code    PT/OT Eval Status: ambulatory.      Dispo - pending clinical improvement      Joy Davidson MD

## 2022-05-01 NOTE — PROGRESS NOTES
@4181- Patient admitted to ICU room 2107. Pt connected to ICU continuous bedside monitor. Patient alert and oriented x4, sinus tach on monitor, SpO2>95% on room air. Pt reports 7/10 cramping in bilateral legs. PRN Norco administered per patient's request. Patient oriented to room, call light within reach. Pt's  at bedside.

## 2022-05-01 NOTE — H&P
Hospital Medicine  History and Physical    PCP: Vera Pavon DO  Patient Name: Kiarra Bloom    Date of Service: Pt seen/examined on 4/30/22 and admitted to Inpatient with expected LOS greater than two midnights due to medical therapy    CHIEF COMPLAINT:  Pt c/o polydipsia and polyuria. She was seen at the [facility] clinic and found to have an elevated glucose level. HISTORY OF PRESENT ILLNESS: Pt is an 33y.o. year-old female with a history of \"pre-diabetes\" and morbid obesity who presents to the emergency room for evaluation following a 3-week history of increased urination, increased thirst and a vaginal yeast infection. She was seen at the [facility] clinic and found to have a glucose level in the 300s and a HgbA1c of over 11. She was instructed to come to the emergency room. In the emergency room she was found to be in DKA. Associated signs and symptoms do not include nausea, vomiting, abdominal pain, fever or chills, hemoptysis, hematochezia, diarrhea or constipation         Past Medical History:        Diagnosis Date    H/O absence seizures 9/13/2019    Influenza A 02/07/2020       Past Surgical History:    No past surgical history on file. Allergies:  Patient has no known allergies. Medications Prior to Admission:    Prior to Admission medications    Medication Sig Start Date End Date Taking?  Authorizing Provider   ferrous sulfate (IRON 325) 325 (65 Fe) MG tablet Take 1 tablet by mouth daily (with breakfast) 4/6/21   Vera Pavon DO   escitalopram (LEXAPRO) 10 MG tablet Take 1 tablet by mouth daily 3/25/21   Vera Pavon DO   ibuprofen (ADVIL;MOTRIN) 800 MG tablet Take 1 tablet by mouth every 8 hours as needed for Pain  Patient not taking: Reported on 3/6/2020 2/7/20   ELENA Caceres   acetaminophen (APAP EXTRA STRENGTH) 500 MG tablet Take 1 tablet by mouth every 6 hours as needed for Pain  Patient not taking: Reported on 3/6/2020 2/7/20   ELENA Caceres   Fesoterodine Fumarate (TOVIAZ PO) Take by mouth    Historical Provider, MD   lamoTRIgine (LAMICTAL XR) 50 MG extended release tablet Take 150 mg by mouth daily 6/5/17   Historical Provider, MD       Family History:       Problem Relation Age of Onset    Diabetes type 2  Mother      Social History:   TOBACCO:   reports that she has never smoked. She has never used smokeless tobacco.  ETOH:   reports previous alcohol use. OCCUPATION:      REVIEW OF SYSTEMS:  A full review of systems was performed and is negative except for that which appears in the HPI    Physical Exam:    Vitals: /70   Pulse 121   Temp 98 °F (36.7 °C) (Oral)   Resp 18   Wt (!) 430 lb 8.9 oz (195.3 kg)   LMP 04/30/2022   SpO2 98%   BMI 73.91 kg/m²   General appearance: Morbidly Obese 33y.o. year-old female who is alert, appears stated age and is cooperative  HEENT: Head: Normocephalic, no lesions, without obvious abnormality. Eye: Normal external eye, conjunctiva, lids cornea, PEERL. Ears: Normal external ears. Non-tender. Nose: Normal external nose, mucus membranes and septum. Pharynx: Dental Hygiene adequate. Normal buccal mucosa. Normal pharynx. Neck: no adenopathy, no carotid bruit, no JVD, supple, symmetrical, trachea midline and thyroid not enlarged, symmetric, no tenderness/mass/nodules  Lungs: clear to auscultation bilaterally and no use of accessory muscles  Heart: rapid rate and regular rhythm, S1, S2 normal, no murmur, click, rub or gallop and normal apical impulse  Abdomen: soft, non-tender; bowel sounds normal; no masses, no organomegaly  Extremities: extremities atraumatic, no cyanosis or edema and Homans sign is negative, no sign of DVT. Capillary Refill: Acceptable < 3 seconds   Peripheral Pulses: +3 easily felt, not easily obliterated with pressures   Skin: Skin color, texture, turgor normal. No rashes or lesions on exposed skin  Neurologic: Neurovascularly intact without any focal sensory/motor deficits.  Cranial nerves: II-XII intact, grossly non-focal. Gait was not tested. Mental Status: Alert and oriented, thought content appropriate, normal insight        CBC:   Recent Labs     04/30/22 1859   WBC 6.0   HGB 12.6        BMP:    Recent Labs     04/30/22 1859 04/30/22  2200   * 133*   K 4.1 3.6   CL 91* 99   CO2 13* 10*   BUN 7 6*   CREATININE 0.7 0.8   GLUCOSE 495* 346*     Troponin: No results for input(s): TROPONINI in the last 72 hours. PT/INR:  No results found for: PTINR  U/A:    Lab Results   Component Value Date    LEUKOCYTESUR Negative 04/30/2022    RBCUA >100 04/30/2022    SPECGRAV 1.040 04/30/2022    UROBILINOGEN 0.2 04/30/2022    BILIRUBINUR Negative 04/30/2022    BLOODU Large 04/30/2022    GLUCOSEU >=1000 04/30/2022    PROTEINU 30 04/30/2022         RAD:   I have independently reviewed and interpreted the imaging studies below and based my recommendations to the patient on those findings. No results found. Assessment:   Principal Problem:    Diabetic ketoacidosis without coma associated with type 2 diabetes mellitus ([address]  Active Problems:    New onset type 2 diabetes mellitus (HCC)    Nausea and vomiting    Vaginal yeast infection    Morbid obesity due to excess calories (HCC)    Sinus tachycardia    High anion gap metabolic acidosis    GERD (gastroesophageal reflux disease)  Resolved Problems:    * No resolved hospital problems. *      Plan:     DKA (diabetic ketoacidoses) - Pt will be admitted to the ICU and will be placed on the DKA protocol. She will be on an insulin gtt initially, and glucose will be monitored closely. She will be given IV Fluids per protocol and electrolytes will be monitored regularly and will be replaced as needed. High anion gap metabolic acidosis - due to DKA. Expect to improve with treatment    Diabetes mellitus, new onset ([address] with Polydipsia and Polyuria - HgbA1c > 11 at the [facility];  Anti TREASURE antibodies (glutamic acid decarboxylase) ordered to distinguish DM type I from DM type II. Patient will be provided diabetic education (the Diabetic Educator has been consulted) and should discharged on an ACEI and Aspirin per American Diabetes Association guidelines. Vaginal yeast infection - Diflucan x 1    Non-intractable vomiting with nausea - A few times over the past week. Will provide symptomatic treatment with Zofran and/or Phenergan as needed, maintenance of fluids and electrolytes. Sinus tachycardia - likely associated with DKA. Monitor for improvement with treatment of DKA and administration of IV fluids    GERD (gastroesophageal reflux disease) - Pt reports increased GERD symptoms over the past few weeks. Will start Protonix    Morbid obesity due to excess calories (Body mass index is 73.91 kg/m². ) - Complicating assessment and treatment. Placing patient at risk for multiple co-morbidities as well as early death and contributing to the patient's presentation.  on weight loss when appropriate. Code Status: Full Code  Diet: Diet NPO Exceptions are: Ice Chips, Sips of Water with Meds, Sips of Clear Liquids  DVT Prophylaxis: Lovenox    (Advanced care planning has been discussed with patient and/or responsible family member and is reflected in the code status.  Further orders associated with this have been entered if appropriate)    Disposition: Anticipate that patient will remain in the hospital for 2 or more midnights depending on further evaluation and clinical course    Please note that over 50 minutes was spent in evaluating the patient, review of records and results, discussion with staff/family, etc.      Umsan Lindsey MD

## 2022-05-01 NOTE — PROGRESS NOTES
This RN spoke with Dr. Candelario Wyman. See new orders to transition patient off of insulin gtt. Okay per MD to downgrade patient to med-surg with no telemetry once patient is off of insulin gtt. 20 units of lantus given at 1228.  Menu given to patient to order lunch so lunch arrives around 2pm.    Electronically signed by Sujata Dixon RN on 5/1/2022 at 12:47 PM

## 2022-05-01 NOTE — ED NOTES
Took pt blood sugar and it was 326,RN Frances at bedside , Pt dose not need a repeat at this time.       Carlie Garcia  04/30/22 1411

## 2022-05-02 VITALS
RESPIRATION RATE: 18 BRPM | WEIGHT: 293 LBS | HEART RATE: 77 BPM | BODY MASS INDEX: 50.02 KG/M2 | DIASTOLIC BLOOD PRESSURE: 59 MMHG | OXYGEN SATURATION: 99 % | TEMPERATURE: 98 F | SYSTOLIC BLOOD PRESSURE: 135 MMHG | HEIGHT: 64 IN

## 2022-05-02 LAB
ANION GAP SERPL CALCULATED.3IONS-SCNC: 16 MMOL/L (ref 3–16)
BUN BLDV-MCNC: 3 MG/DL (ref 7–20)
CALCIUM SERPL-MCNC: 9.1 MG/DL (ref 8.3–10.6)
CHLORIDE BLD-SCNC: 101 MMOL/L (ref 99–110)
CO2: 18 MMOL/L (ref 21–32)
CREAT SERPL-MCNC: 0.7 MG/DL (ref 0.6–1.1)
GFR AFRICAN AMERICAN: >60
GFR NON-AFRICAN AMERICAN: >60
GLUCOSE BLD-MCNC: 295 MG/DL (ref 70–99)
GLUCOSE BLD-MCNC: 367 MG/DL (ref 70–99)
GLUCOSE BLD-MCNC: 374 MG/DL (ref 70–99)
MAGNESIUM: 2 MG/DL (ref 1.8–2.4)
PERFORMED ON: ABNORMAL
PERFORMED ON: ABNORMAL
PHOSPHORUS: 3.5 MG/DL (ref 2.5–4.9)
POTASSIUM SERPL-SCNC: 4.4 MMOL/L (ref 3.5–5.1)
SODIUM BLD-SCNC: 135 MMOL/L (ref 136–145)

## 2022-05-02 PROCEDURE — 84100 ASSAY OF PHOSPHORUS: CPT

## 2022-05-02 PROCEDURE — 6360000002 HC RX W HCPCS: Performed by: INTERNAL MEDICINE

## 2022-05-02 PROCEDURE — 6370000000 HC RX 637 (ALT 250 FOR IP): Performed by: STUDENT IN AN ORGANIZED HEALTH CARE EDUCATION/TRAINING PROGRAM

## 2022-05-02 PROCEDURE — 80048 BASIC METABOLIC PNL TOTAL CA: CPT

## 2022-05-02 PROCEDURE — 36415 COLL VENOUS BLD VENIPUNCTURE: CPT

## 2022-05-02 PROCEDURE — 2580000003 HC RX 258: Performed by: INTERNAL MEDICINE

## 2022-05-02 PROCEDURE — 83735 ASSAY OF MAGNESIUM: CPT

## 2022-05-02 PROCEDURE — C9113 INJ PANTOPRAZOLE SODIUM, VIA: HCPCS | Performed by: INTERNAL MEDICINE

## 2022-05-02 RX ORDER — BLOOD-GLUCOSE METER
1 EACH MISCELLANEOUS
Qty: 1 KIT | Refills: 1 | Status: SHIPPED | OUTPATIENT
Start: 2022-05-02 | End: 2022-06-02 | Stop reason: SDUPTHER

## 2022-05-02 RX ORDER — INSULIN GLARGINE 100 [IU]/ML
33 INJECTION, SOLUTION SUBCUTANEOUS NIGHTLY
Qty: 9.9 ML | Refills: 0 | Status: SHIPPED | OUTPATIENT
Start: 2022-05-02 | End: 2022-05-17 | Stop reason: SDUPTHER

## 2022-05-02 RX ORDER — INSULIN ASPART 100 [IU]/ML
0-10 INJECTION, SOLUTION INTRAVENOUS; SUBCUTANEOUS
Qty: 5 PEN | Refills: 3 | Status: SHIPPED | OUTPATIENT
Start: 2022-05-02 | End: 2022-06-02

## 2022-05-02 RX ORDER — INSULIN ASPART 100 [IU]/ML
8 INJECTION, SOLUTION INTRAVENOUS; SUBCUTANEOUS
Qty: 5 PEN | Refills: 3 | Status: SHIPPED | OUTPATIENT
Start: 2022-05-02 | End: 2022-05-17 | Stop reason: SDUPTHER

## 2022-05-02 RX ORDER — PANTOPRAZOLE SODIUM 40 MG/1
40 TABLET, DELAYED RELEASE ORAL
Status: DISCONTINUED | OUTPATIENT
Start: 2022-05-03 | End: 2022-05-02 | Stop reason: HOSPADM

## 2022-05-02 RX ORDER — GLUCOSAMINE HCL/CHONDROITIN SU 500-400 MG
CAPSULE ORAL
Qty: 50 STRIP | Refills: 0 | Status: SHIPPED | OUTPATIENT
Start: 2022-05-02 | End: 2022-05-17 | Stop reason: SDUPTHER

## 2022-05-02 RX ORDER — INSULIN LISPRO 100 [IU]/ML
8 INJECTION, SOLUTION INTRAVENOUS; SUBCUTANEOUS
Status: DISCONTINUED | OUTPATIENT
Start: 2022-05-02 | End: 2022-05-02 | Stop reason: HOSPADM

## 2022-05-02 RX ORDER — LANCETS
2 EACH MISCELLANEOUS
Qty: 90 EACH | Refills: 2 | Status: SHIPPED | OUTPATIENT
Start: 2022-05-02 | End: 2022-05-23 | Stop reason: SDUPTHER

## 2022-05-02 RX ADMIN — INSULIN LISPRO 9 UNITS: 100 INJECTION, SOLUTION INTRAVENOUS; SUBCUTANEOUS at 12:15

## 2022-05-02 RX ADMIN — INSULIN LISPRO 15 UNITS: 100 INJECTION, SOLUTION INTRAVENOUS; SUBCUTANEOUS at 08:40

## 2022-05-02 RX ADMIN — SODIUM CHLORIDE, PRESERVATIVE FREE 40 MG: 5 INJECTION INTRAVENOUS at 08:38

## 2022-05-02 RX ADMIN — INSULIN LISPRO 8 UNITS: 100 INJECTION, SOLUTION INTRAVENOUS; SUBCUTANEOUS at 08:40

## 2022-05-02 RX ADMIN — INSULIN LISPRO 8 UNITS: 100 INJECTION, SOLUTION INTRAVENOUS; SUBCUTANEOUS at 12:14

## 2022-05-02 RX ADMIN — ENOXAPARIN SODIUM 60 MG: 100 INJECTION SUBCUTANEOUS at 08:38

## 2022-05-02 ASSESSMENT — PAIN SCALES - GENERAL: PAINLEVEL_OUTOF10: 0

## 2022-05-02 NOTE — DISCHARGE SUMMARY
Hospital Medicine Discharge Summary    Patient ID: Hay Xavier      Patient's PCP: Dena Chapman DO    Admit Date: 4/30/2022     Discharge Date:   05/02/22      Admitting Provider: Erich Louise MD     Discharge Provider: Nahid Odonnell MD     Discharge Diagnoses: Active Hospital Problems    Diagnosis     GERD (gastroesophageal reflux disease) [K21.9]      Priority: Medium    Diabetic ketoacidosis without coma associated with type 2 diabetes mellitus (HCC) [E11.10]      Priority: Medium    New onset type 2 diabetes mellitus ([address] [E11.9]      Priority: Medium    Nausea and vomiting [R11.2]      Priority: Medium    Vaginal yeast infection [B37.3]      Priority: Medium    Morbid obesity due to excess calories ([address] [E66.01]      Priority: Medium    Sinus tachycardia [R00.0]      Priority: Medium    High anion gap metabolic acidosis [M22.1]      Priority: Medium       The patient was seen and examined on day of discharge and this discharge summary is in conjunction with any daily progress note from day of discharge. Hospital Course:       33y.o. year-old female with a history of \"pre-diabetes\" and morbid obesity who presents to the emergency room for evaluation following a 3-week history of increased urination, increased thirst and a vaginal yeast infection. She [facility] and found to have a glucose level in the 300s and a HgbA1c of over 11. She was instructed to come to the emergency room. In the emergency room she was found to be in DKA. 5/1 transitioned off insulin drip. Diabetic ketoacidosis. Newly diagnosed diabetes mellitus. Anion gap metabolic acidosis  Patient had initially presented with polyuria and polydipsia to urgent care, was found to be in DKA, patient was not known to be diabetic.   A1c at outside clinic was found to be 11.9, anti TREASURE ordered to further specify type of diabetes, however given high A1c patient will need to start on insulin either way. Patient initially started on insulin infusion, on DKA protocol  Gap closed  Plan. -Patient to follow-up as an outpatient, to follow yony. - We will empirically start patient on metformin for now. -Glargine 33 units, with short acting insulin 8 units with meal as well as medium sliding scale.  -Patient prescribed glucometer strips and lancets. Vaginal yeast infection. Was given Diflucan.     Nausea and vomiting. Improved, likely secondary to DKA     Morbid obesity, BMI 73. Complicating course.      GERD. Continue pantoprazole    Physical Exam Performed:     BP (!) 135/59   Pulse 77   Temp 98 °F (36.7 °C) (Oral)   Resp 18   Ht 5' 4\" (1.626 m)   Wt (!) 435 lb 3 oz (197.4 kg)   LMP 04/30/2022   SpO2 99%   BMI 74.70 kg/m²       General appearance:  No apparent distress, appears stated age and cooperative. HEENT:  Normal cephalic, atraumatic without obvious deformity. Pupils equal, round, and reactive to light. Extra ocular muscles intact. Conjunctivae/corneas clear. Neck: Supple, with full range of motion. No jugular venous distention. Trachea midline. Respiratory:  Normal respiratory effort. Clear to auscultation, bilaterally without Rales/Wheezes/Rhonchi. Cardiovascular:  Regular rate and rhythm with normal S1/S2 without murmurs, rubs or gallops. Abdomen: Soft, non-tender, non-distended with normal bowel sounds. Musculoskeletal:  No clubbing, cyanosis or edema bilaterally. Full range of motion without deformity. Skin: Skin color, texture, turgor normal.  No rashes or lesions. Neurologic:  Neurovascularly intact without any focal sensory/motor deficits. Cranial nerves: II-XII intact, grossly non-focal.  Psychiatric:  Alert and oriented, thought content appropriate, normal insight  Capillary Refill: Brisk,< 3 seconds   Peripheral Pulses: +2 palpable, equal bilaterally       Labs:  For convenience and continuity at follow-up the following most recent labs are provided:      CBC:    Lab Results   Component Value Date    WBC 6.0 04/30/2022    HGB 12.6 04/30/2022    HCT 40.5 04/30/2022     04/30/2022       Renal:    Lab Results   Component Value Date     05/02/2022    K 4.4 05/02/2022    K 3.6 04/30/2022     05/02/2022    CO2 18 05/02/2022    BUN 3 05/02/2022    CREATININE 0.7 05/02/2022    CALCIUM 9.1 05/02/2022    PHOS 3.5 05/02/2022         Significant Diagnostic Studies    Radiology:   No orders to display          Consults:     IP CONSULT TO HOSPITALIST  IP CONSULT TO DIABETES EDUCATOR    Disposition:  Home      Condition at Discharge: Stable    Discharge Instructions/Follow-up:    -Patient was prescribed insulin as well as metformin.  -Follow-up anti TREASURE as outpatient.  -Follow-up with PCP/endocrinology    Code Status:  Full Code     Activity: activity as tolerated    Diet: diabetic diet      Discharge Medications:     Current Discharge Medication List           Details   blood glucose monitor strips Test 3 times a day & as needed for symptoms of irregular blood glucose.  Dispense sufficient amount for indicated testing frequency plus additional to accommodate PRN testing needs Pharmacy to make formulary changes as needed  Qty: 50 strip, Refills: 0      Blood Glucose Monitoring Suppl (ACCU-CHEK GUIDE) w/Device KIT 1 each by Does not apply route 3 times daily (before meals) Pharmacy to make formulary changes as needed  Qty: 1 kit, Refills: 1      Accu-Chek FastClix Lancets MISC 2 Lancet by Does not apply route 3 times daily (before meals) Pharmacy to make formulary changes as needed  Qty: 90 each, Refills: 2      insulin glargine (LANTUS SOLOSTAR) 100 UNIT/ML injection pen Inject 33 Units into the skin nightly  Qty: 9.9 mL, Refills: 0      Insulin Pen Needle 32G X 4 MM MISC 1 each by Does not apply route daily  Qty: 100 each, Refills: 3      !! insulin aspart (NOVOLOG FLEXPEN) 100 UNIT/ML injection pen Inject 8 Units into the skin 3 times daily (before meals)  Qty: 5 pen, Refills: 3      !! insulin aspart (NOVOLOG FLEXPEN) 100 UNIT/ML injection pen Inject 0-10 Units into the skin 3 times daily (before meals) **Medium Dose Correction Algorithm** Glucose: Dose:  No Insulin 140-199 2 Units 200-249 4 Units 250-299 6 Units 300-349 8 Units 350-399 10 Units 400 and above 12 Units  Qty: 5 pen, Refills: 3       !! - Potential duplicate medications found. Please discuss with provider. Time Spent on discharge is more than 30 minutes in the examination, evaluation, counseling and review of medications and discharge plan. Signed:    Luanne Costa MD   5/2/2022      Thank you Mark Hammer DO for the opportunity to be involved in this patient's care. If you have any questions or concerns, please feel free to contact me at [phone].

## 2022-05-02 NOTE — FLOWSHEET NOTE
05/01/22 2000   Treatment Team Notification   Reason for Communication Medication concern  ()   Team Member Name [address] Team Role Attending Provider   Method of Communication Secure Message   Response See orders   Notification Time [id number]

## 2022-05-02 NOTE — PROGRESS NOTES
Discharge order placed for pt. Pt demonstrated how to inject insulin herself. Educated pt on her insulin, the doses, and when to administer it. PT verbalized understanding. Walked out with her .

## 2022-05-02 NOTE — CARE COORDINATION
[facility]ail Pharmacy called to state the pharmacy bill is $79 approximately. Met with patient who reports she does not get paid until May 12. Asked if she can off set any of the cost at this tme and she denied. Gave her resources for  Eleonora Kerr for future needs and sent referral via fax. Medication Assistance Voucher  Patient: Nilesh Dunn  YOB: 1995    Floor/Unit:[id number]  Discharge Date: 5/2/2022   Approver Name/Title:    Cost Center to be cross-charged: [id number]  Medications Approved : CLINICAL PHARMACY NOTE: MEDS TO BEDS    Total # of Prescriptions Filled: 9   The following medications were delivered to the patient:  Discharge Medication List as of 5/2/2022  1:24 PM      START taking these medications    Details   blood glucose monitor strips Test 3 times a day & as needed for symptoms of irregular blood glucose.  Dispense sufficient amount for indicated testing frequency plus additional to accommodate PRN testing needs Pharmacy to make formulary changes as needed, Disp-50 strip, R-0, Normal      Blood Glucose Monitoring Suppl (ACCU-CHEK GUIDE) w/Device KIT 1 each by Does not apply route 3 times daily (before meals) Pharmacy to make formulary changes as needed, Disp-1 kit, R-1Normal      Accu-Chek FastClix Lancets MISC 3 TIMES DAILY BEFORE MEALS Starting Mon 5/2/2022, Until Sun 7/31/2022, For 90 days, Disp-90 each, R-2, NormalPharmacy to make formulary changes as needed      insulin glargine (LANTUS SOLOSTAR) 100 UNIT/ML injection pen Inject 33 Units into the skin nightly, Disp-9.9 mL, R-0Normal      Insulin Pen Needle 32G X 4 MM MISC DAILY Starting Mon 5/2/2022, Disp-100 each, R-3, Normal      !! insulin aspart (NOVOLOG FLEXPEN) 100 UNIT/ML injection pen Inject 8 Units into the skin 3 times daily (before meals), Disp-5 pen, R-3Normal      !! insulin aspart (NOVOLOG FLEXPEN) 100 UNIT/ML injection pen Inject 0-10 Units into the skin 3 times daily (before meals) **Medium Dose Correction Algorithm** Glucose: Dose:  No Insulin 140-199 2 Units 200-249 4 Units 250-299 6 Units 300-349 8 Units 350-399 10 Units 400 and above 12 Units, Disp-5 pen, R-3No rmal      metFORMIN (GLUCOPHAGE) 500 MG tablet Take 1 tablet by mouth 2 times daily (with meals), Disp-60 tablet, R-0Normal       !! - Potential duplicate medications found. Please discuss with provider. ·       Additional Documentation:      By signing below, I attest that I have the authority to authorize medication assistance for the patient and medications listed above. I understand that the cost center listed above will be cross-charged for the total cost of the drugs dispensed.   Pharmacy Instructions  Maia Manuel authorized prescriptions listed above to third party plan Annalisa Hopson  o ID: Amira Membreno last name  o Group: Cost center of the unit/department that authorized medication assistance   Retain this document for future reference  YOLA Russo     Case Management   [phone]    5/2/2022  3:09 PM

## 2022-05-02 NOTE — CARE COORDINATION
Chart Reviewed. Met with patient to introduce  role and to initiate discussion regarding DC planning. INITIAL CASE MANAGEMENT ASSESSMENT      Living Situation:    Pt lives with spouse. She reports she is totally independent at home; no dme used. ADLs:   Totally independent     DME:   none    PT/OT Recs:   none     Active Services:   none     Transportation:   Spouse at bedside will transport her home. Medications:   Usually [facility] [facility] or [facility] [address]    PCP:   Dr Francie Ingram      HD/PD:   neither    PLAN/COMMENTS:   Goal is home today with spouse driving her. No needs.     [place], [place]     Case Management   [phone]    5/2/2022  2:00 PM

## 2022-05-02 NOTE — PROGRESS NOTES
[facility]  Diabetes Education   Progress Note       NAME:  Alec Vickers RECORD NUMBER:  [id number]  AGE: 33 y.o. GENDER: female  : 1995  TODAY'S DATE:  2022    Subjective   Reason for Diabetic Education Evaluation and Assessment: new onset diabetes    Carolina agrees to meet with me for diabetes education. Visit Type: evaluation      Abhay Ramires is a 33 y.o. female referred by:     [x] Physician  [] Nursing  [] Chart Review   [] Other:     PAST MEDICAL HISTORY        Diagnosis Date    H/O absence seizures 2019    Influenza A 2020       PAST SURGICAL HISTORY    No past surgical history on file.     FAMILY HISTORY    Family History   Problem Relation Age of Onset    Diabetes type 2  Mother        SOCIAL HISTORY    Social History     Tobacco Use    Smoking status: Never Smoker    Smokeless tobacco: Never Used   Substance Use Topics    Alcohol use: Not Currently    Drug use: Never       ALLERGIES    No Known Allergies    MEDICATIONS     insulin lispro  8 Units SubCUTAneous TID WC    [START ON 5/3/2022] pantoprazole  40 mg Oral QAM AC    insulin lispro  0-18 Units SubCUTAneous TID WC    insulin lispro  0-9 Units SubCUTAneous Nightly    insulin glargine  28 Units SubCUTAneous Nightly    enoxaparin  60 mg SubCUTAneous BID    sodium chloride  15 mL/kg IntraVENous Once       Objective        Patient Active Problem List   Diagnosis Code    H/O absence seizures Z86.69    Diabetic ketoacidosis without coma associated with type 2 diabetes mellitus ([facility] [address] E11.10    New onset type 2 diabetes mellitus (HCC) E11.9    Nausea and vomiting R11.2    Vaginal yeast infection B37.3    Morbid obesity due to excess calories (HCC) E66.01    Sinus tachycardia R00.0    High anion gap metabolic acidosis M06.5    GERD (gastroesophageal reflux disease) K21.9        BP (!) 135/59   Pulse 77   Temp 98 °F (36.7 °C) (Oral)   Resp 18   Ht 5' 4\" (1.626 m)   Wt (!) 435 lb 3 oz (197.4 kg)   LMP 04/30/2022   SpO2 99%   BMI 74.70 kg/m²     HgBA1c:    Lab Results   Component Value Date    LABA1C 11.5 04/30/2022       Recent Labs     05/01/22  1407 05/01/22  1837 05/01/22 2051 05/02/22  0836   POCGLU 174* 327* 346* 367*       BUN/Creatinine:    Lab Results   Component Value Date    BUN 3 05/02/2022    CREATININE 0.7 05/02/2022       Assessment        Diabetes Management and Education    Does the patient have a Primary Care Physician? Yes, Vera Pavon, DO       Does the patient require new medication instruction? Yes  Introduced basal and meal time insulin concepts. Person responsible for administration of Insulin/Medication:        [x] Self     [] Caregiver       [] Spouse       [] Other Family Member   []  Other    Insulin Instruction:  insulin pen  Injection Site:   [x] location    [x] rotation     Level of patient/caregiver understanding able to:       [x] Verbalized Understanding   [x] Demonstrated Understanding       [] Teach Back       [] Needs Reinforcement     []  Other:        Does the patient/caregiver monitor Blood Glucoses? No:   Reviewed glycemic control targets, testing frequency and when to call PCP. Level of patient/caregiver understanding able to:        [x] Verbalized Understanding   [] Demonstrated Understanding       [] Teach Back       [] Needs Reinforcement     []  Other:        Does the patient/caregiver follow a Meal Plan? No:   Recommend making water, unsweetened tea or coffee primary drinks. Identified common carbs and portion sizes. Reviewed importance of eating three meals per day and plate method for consistent carb intake. Level of patient/caregiver understanding able to:       [x] Verbalized Understanding   [] Demonstrated Understanding       [] Teach Back       [] Needs Reinforcement     []  Other:      Does the patient/caregiver understand S/S of Hypoglycemia? No:   Reviewed symptoms, prevention and treatment.      Level of patient/caregiver understanding able to:       [x] Verbalized Understanding   [] Demonstrated Understanding       [] Teach Back       [] Needs Reinforcement     []  Other:                    Does the patient/caregiver understand S/S of Hyperglycemia? No:     Reviewed symptoms, prevention and treatment. Level of patient/caregiver understanding able to:        [x] Verbalized Understanding   [] Demonstrated Understanding       [] Teach Back       [] Needs Reinforcement     []  Other:           Plan        Ongoing diabetes education and blood glucose monitoring. Recommend meds to beds. Recommend increasing prandial and basal insulin doses. The following educational and support materials were provided:  · My contact information  · ADA booklet - Living Well with Diabetes        · Nutrition in the [facility].          · The Diabetes Education Program:  Planning Healthy Meals   · Academy of Nutrition and Dietetics handout - Carbohydrate Counting for People with Diabetes  · Blood Glucose Log Book                                           Discharge Plan:  Discharge needs: insulin pens and 4mm pen needles and glucometer/strips/lancets  Placement for patient upon discharge: independent living        Teaching Time Diabetes Education:  70 minutes     Electronically signed by Mauro Maynard on 5/2/2022 at 11:07 AM

## 2022-05-03 ENCOUNTER — OFFICE VISIT (OUTPATIENT)
Dept: FAMILY MEDICINE CLINIC | Age: 27
End: 2022-05-03
Payer: COMMERCIAL

## 2022-05-03 VITALS — SYSTOLIC BLOOD PRESSURE: 132 MMHG | OXYGEN SATURATION: 97 % | DIASTOLIC BLOOD PRESSURE: 60 MMHG | HEART RATE: 108 BPM

## 2022-05-03 DIAGNOSIS — K21.00 GASTROESOPHAGEAL REFLUX DISEASE WITH ESOPHAGITIS WITHOUT HEMORRHAGE: ICD-10-CM

## 2022-05-03 DIAGNOSIS — E11.10 DIABETIC KETOACIDOSIS WITHOUT COMA ASSOCIATED WITH TYPE 2 DIABETES MELLITUS (HCC): Primary | ICD-10-CM

## 2022-05-03 DIAGNOSIS — E66.01 MORBID OBESITY DUE TO EXCESS CALORIES (HCC): ICD-10-CM

## 2022-05-03 LAB — GLUTAMIC ACID DECARB AB: <5 IU/ML (ref 0–5)

## 2022-05-03 PROCEDURE — 3046F HEMOGLOBIN A1C LEVEL >9.0%: CPT | Performed by: FAMILY MEDICINE

## 2022-05-03 PROCEDURE — 99214 OFFICE O/P EST MOD 30 MIN: CPT | Performed by: FAMILY MEDICINE

## 2022-05-03 SDOH — ECONOMIC STABILITY: FOOD INSECURITY: WITHIN THE PAST 12 MONTHS, YOU WORRIED THAT YOUR FOOD WOULD RUN OUT BEFORE YOU GOT MONEY TO BUY MORE.: NEVER TRUE

## 2022-05-03 SDOH — ECONOMIC STABILITY: FOOD INSECURITY: WITHIN THE PAST 12 MONTHS, THE FOOD YOU BOUGHT JUST DIDN'T LAST AND YOU DIDN'T HAVE MONEY TO GET MORE.: NEVER TRUE

## 2022-05-03 ASSESSMENT — PATIENT HEALTH QUESTIONNAIRE - PHQ9
1. LITTLE INTEREST OR PLEASURE IN DOING THINGS: 0
SUM OF ALL RESPONSES TO PHQ QUESTIONS 1-9: 0
SUM OF ALL RESPONSES TO PHQ QUESTIONS 1-9: 0
4. FEELING TIRED OR HAVING LITTLE ENERGY: 0
3. TROUBLE FALLING OR STAYING ASLEEP: 0
7. TROUBLE CONCENTRATING ON THINGS, SUCH AS READING THE NEWSPAPER OR WATCHING TELEVISION: 0
6. FEELING BAD ABOUT YOURSELF - OR THAT YOU ARE A FAILURE OR HAVE LET YOURSELF OR YOUR FAMILY DOWN: 0
SUM OF ALL RESPONSES TO PHQ9 QUESTIONS 1 & 2: 0
10. IF YOU CHECKED OFF ANY PROBLEMS, HOW DIFFICULT HAVE THESE PROBLEMS MADE IT FOR YOU TO DO YOUR WORK, TAKE CARE OF THINGS AT HOME, OR GET ALONG WITH OTHER PEOPLE: 0
5. POOR APPETITE OR OVEREATING: 0
SUM OF ALL RESPONSES TO PHQ QUESTIONS 1-9: 0
SUM OF ALL RESPONSES TO PHQ QUESTIONS 1-9: 0
2. FEELING DOWN, DEPRESSED OR HOPELESS: 0
9. THOUGHTS THAT YOU WOULD BE BETTER OFF DEAD, OR OF HURTING YOURSELF: 0
8. MOVING OR SPEAKING SO SLOWLY THAT OTHER PEOPLE COULD HAVE NOTICED. OR THE OPPOSITE, BEING SO FIGETY OR RESTLESS THAT YOU HAVE BEEN MOVING AROUND A LOT MORE THAN USUAL: 0

## 2022-05-03 ASSESSMENT — SOCIAL DETERMINANTS OF HEALTH (SDOH): HOW HARD IS IT FOR YOU TO PAY FOR THE VERY BASICS LIKE FOOD, HOUSING, MEDICAL CARE, AND HEATING?: NOT HARD AT ALL

## 2022-05-03 NOTE — PROGRESS NOTES
5/3/2022     Terell Nelson (:  1995) is a 33 y.o. female, here for evaluation of the following medical concerns:    HPI     Patient rtc due to recent hospitalization for DKA. Her discharge note stated the following. \"Patient ID: Terell Nelson                 Patient's PCP: Connie Castro DO     Admit Date: 2022      Discharge Date:   22        Admitting Provider: Rama Rosenberg MD     Discharge Provider: Pauline Ramirez MD      Discharge Diagnoses:              Active Hospital Problems     Diagnosis      GERD (gastroesophageal reflux disease) [K21.9]         Priority: Medium    Diabetic ketoacidosis without coma associated with type 2 diabetes mellitus ([address] [E11.10]         Priority: Medium    New onset type 2 diabetes mellitus ([address] [E11.9]         Priority: Medium    Nausea and vomiting [R11.2]         Priority: Medium    Vaginal yeast infection [B37.3]         Priority: Medium    Morbid obesity due to excess calories (HCC) [E66.01]         Priority: Medium    Sinus tachycardia [R00.0]         Priority: Medium    High anion gap metabolic acidosis [X34.3]         Priority: Medium         The patient was seen and examined on day of discharge and this discharge summary is in conjunction with any daily progress note from day of discharge.     Hospital Course:         33y.o. year-old female with a history of \"pre-diabetes\" and morbid obesity who presents to the emergency room for evaluation following a 3-week history of increased urination, increased thirst and a vaginal yeast infection.  [facility] and found to have a glucose level in the 300s and a HgbA1c of over 11.  She was instructed to come to the emergency room.    In the emergency room she was found to be in DKA.      transitioned off insulin drip.        Diabetic ketoacidosis. Newly diagnosed diabetes mellitus.   Anion gap metabolic acidosis  Patient had initially presented with polyuria and polydipsia to urgent care, was found to be in DKA, patient was not known to be diabetic. A1c at outside clinic was found to be 11.9, anti TREASURE ordered to further specify type of diabetes, however given high A1c patient will need to start on insulin either way.   Patient initially started on insulin infusion, on DKA protocol  Gap closed  Plan.      -Patient to follow-up as an outpatient, to follow treasure. - We will empirically start patient on metformin for now. -Glargine 33 units, with short acting insulin 8 units with meal as well as medium sliding scale.  -Patient prescribed glucometer strips and lancets.      Vaginal yeast infection. Was given Diflucan.     Nausea and vomiting. Improved, likely secondary to DKA     Morbid obesity, BMI 73. Complicating course.      GERD. Continue pantoprazole    LANTUS 33 units  Patient will increase to 35 units  novolog 8 +sliding scale\"    She seems to be improve thought her glucose numbers are still greater than 300. Will increase her Lantus. GERD- well controlled at this time. No side effects from the medication    Obesity- needs to lose weight and understands this. Today, she denied chest pain, sob,n, v, or diarrhea. Review of Systems   Constitutional: Positive for fatigue. Negative for activity change, fever and unexpected weight change. Respiratory: Negative for shortness of breath. Cardiovascular: Negative for chest pain, palpitations and leg swelling. Gastrointestinal: Negative for abdominal pain, diarrhea, nausea and vomiting. Musculoskeletal: Positive for arthralgias and back pain. Neurological: Negative for light-headedness and headaches. Psychiatric/Behavioral: Negative for dysphoric mood. The patient is not nervous/anxious. Prior to Visit Medications    Medication Sig Taking? Authorizing Provider   blood glucose monitor strips Test 3 times a day & as needed for symptoms of irregular blood glucose.  Dispense sufficient amount for indicated testing frequency plus additional to accommodate PRN testing needs Pharmacy to make formulary changes as needed Yes Ariadna White MD   Blood Glucose Monitoring Suppl (ACCU-CHEK GUIDE) w/Device KIT 1 each by Does not apply route 3 times daily (before meals) Pharmacy to make formulary changes as needed Yes Ariadna White MD   Accu-Chek FastClix Lancets MISC 2 Lancet by Does not apply route 3 times daily (before meals) Pharmacy to make formulary changes as needed Yes Ariadna White MD   insulin glargine (LANTUS SOLOSTAR) 100 UNIT/ML injection pen Inject 33 Units into the skin nightly Yes Ariadna White MD   Insulin Pen Needle 32G X 4 MM MISC 1 each by Does not apply route daily Yes Ariadna White MD   insulin aspart (NOVOLOG FLEXPEN) 100 UNIT/ML injection pen Inject 8 Units into the skin 3 times daily (before meals) Yes Ariadna White MD   insulin aspart (NOVOLOG FLEXPEN) 100 UNIT/ML injection pen Inject 0-10 Units into the skin 3 times daily (before meals) **Medium Dose Correction Algorithm** Glucose: Dose:  No Insulin 140-199 2 Units 200-249 4 Units 250-299 6 Units 300-349 8 Units 350-399 10 Units 400 and above 12 Units Yes Ariadna White MD   metFORMIN (GLUCOPHAGE) 500 MG tablet Take 1 tablet by mouth 2 times daily (with meals) Yes Ariadna White MD        Social History     Tobacco Use    Smoking status: Never Smoker    Smokeless tobacco: Never Used   Substance Use Topics    Alcohol use: Not Currently        Vitals:    05/03/22 1601   BP: 132/60   Pulse: 108   SpO2: 97%     Estimated body mass index is 74.7 kg/m² as calculated from the following:    Height as of 5/1/22: 5' 4\" (1.626 m). Weight as of 5/2/22: 435 lb 3 oz (197.4 kg). Physical Exam  Vitals and nursing note reviewed. Constitutional:       Appearance: She is well-developed. HENT:      Head: Normocephalic and atraumatic.       Right Ear: Tympanic membrane, ear canal and external ear normal. Left Ear: Tympanic membrane, ear canal and external ear normal.      Nose: Nose normal.   Eyes:      Conjunctiva/sclera: Conjunctivae normal.      Pupils: Pupils are equal, round, and reactive to light. Cardiovascular:      Rate and Rhythm: Normal rate and regular rhythm. Heart sounds: Normal heart sounds. No murmur heard. Pulmonary:      Effort: Pulmonary effort is normal.      Breath sounds: Normal breath sounds. No wheezing. Abdominal:      General: Bowel sounds are normal.      Palpations: Abdomen is soft. Tenderness: There is no abdominal tenderness. Musculoskeletal:         General: Tenderness present. Skin:     General: Skin is warm and dry. Neurological:      Mental Status: She is alert and oriented to person, place, and time. Psychiatric:         Behavior: Behavior normal.         ASSESSMENT/PLAN:  1. Diabetic ketoacidosis without coma associated with type 2 diabetes mellitus ([facility] [address]  Patient will need to keep a log of his glucose readings and bring them to the office. He needs to monitor his diet and exercise. Attempt to lose weight. Discussed proper eating habits. Continue to take medication as prescribed. Referred to ENDO  Educated on signs and symptoms for immediate evaluation in the ER.   - Sun Bedoya MD, Endocrinology, [facility]    2. Gastroesophageal reflux disease with esophagitis without hemorrhage  Stable  Continue with medication  Answered questions    3. Morbid obesity due to excess calories (HCC)  Discussed the need to exercise 30 minutes each day. Monitor diet and push water. Reduce refined carbs and replace with fiber and vegetables. Decrease portion size and limit snacking, stop eating after dinner  Count calories. Return in about 6 months (around 11/3/2022).

## 2022-05-09 ASSESSMENT — ENCOUNTER SYMPTOMS
ABDOMINAL PAIN: 0
NAUSEA: 0
VOMITING: 0
DIARRHEA: 0
SHORTNESS OF BREATH: 0
BACK PAIN: 1

## 2022-05-17 ENCOUNTER — OFFICE VISIT (OUTPATIENT)
Dept: FAMILY MEDICINE CLINIC | Age: 27
End: 2022-05-17
Payer: COMMERCIAL

## 2022-05-17 VITALS
WEIGHT: 293 LBS | DIASTOLIC BLOOD PRESSURE: 72 MMHG | OXYGEN SATURATION: 98 % | HEART RATE: 108 BPM | SYSTOLIC BLOOD PRESSURE: 134 MMHG | BODY MASS INDEX: 75.18 KG/M2

## 2022-05-17 DIAGNOSIS — E11.9 NEW ONSET TYPE 2 DIABETES MELLITUS (HCC): Primary | ICD-10-CM

## 2022-05-17 DIAGNOSIS — L03.113 CELLULITIS OF RIGHT UPPER EXTREMITY: ICD-10-CM

## 2022-05-17 PROCEDURE — 3046F HEMOGLOBIN A1C LEVEL >9.0%: CPT | Performed by: FAMILY MEDICINE

## 2022-05-17 PROCEDURE — 99213 OFFICE O/P EST LOW 20 MIN: CPT | Performed by: FAMILY MEDICINE

## 2022-05-17 RX ORDER — GLUCOSAMINE HCL/CHONDROITIN SU 500-400 MG
CAPSULE ORAL
Qty: 300 STRIP | Refills: 5 | Status: SHIPPED | OUTPATIENT
Start: 2022-05-17 | End: 2022-05-23 | Stop reason: SDUPTHER

## 2022-05-17 RX ORDER — DOXYCYCLINE HYCLATE 100 MG
100 TABLET ORAL 2 TIMES DAILY
Qty: 20 TABLET | Refills: 0 | Status: SHIPPED | OUTPATIENT
Start: 2022-05-17 | End: 2022-05-27

## 2022-05-17 RX ORDER — INSULIN ASPART 100 [IU]/ML
8 INJECTION, SOLUTION INTRAVENOUS; SUBCUTANEOUS
Qty: 15 PEN | Refills: 3 | Status: SHIPPED | OUTPATIENT
Start: 2022-05-17 | End: 2022-06-02 | Stop reason: SDUPTHER

## 2022-05-17 RX ORDER — INSULIN GLARGINE 100 [IU]/ML
33 INJECTION, SOLUTION SUBCUTANEOUS NIGHTLY
Qty: 15 PEN | Refills: 0 | Status: SHIPPED | OUTPATIENT
Start: 2022-05-17 | End: 2022-06-02 | Stop reason: SDUPTHER

## 2022-05-17 NOTE — LETTER
[facility]  [facility] [address]. [address] [address]  Phone: [phone]  Fax: Ian Rubio DO        May 17, 2022     Patient: Deloris Larose   YOB: 1995   Date of Visit: 5/17/2022       To Whom It May Concern: It is my medical opinion that Deloris Larose be excused from work today, 05/17/2022, due to seeing me in my clinic for a medical related issue that I am treating her for. She may return to work on 05/18/2022. If you have any questions or concerns, please don't hesitate to call.     Sincerely,        Jesse Stein DO

## 2022-05-17 NOTE — PROGRESS NOTES
not apply route 3 times daily (before meals) Pharmacy to make formulary changes as needed Yes Ariadna White MD   Accu-Chek FastClix Lancets MISC 2 Lancet by Does not apply route 3 times daily (before meals) Pharmacy to make formulary changes as needed Yes Ariadna White MD   Insulin Pen Needle 32G X 4 MM MISC 1 each by Does not apply route daily Yes Ariadna White MD   insulin aspart (NOVOLOG FLEXPEN) 100 UNIT/ML injection pen Inject 0-10 Units into the skin 3 times daily (before meals) **Medium Dose Correction Algorithm** Glucose: Dose:  No Insulin 140-199 2 Units 200-249 4 Units 250-299 6 Units 300-349 8 Units 350-399 10 Units 400 and above 12 Units Yes Ariadna White MD   metFORMIN (GLUCOPHAGE) 500 MG tablet Take 1 tablet by mouth 2 times daily (with meals) Yes Ariadna White MD        Social History     Tobacco Use    Smoking status: Never Smoker    Smokeless tobacco: Never Used   Substance Use Topics    Alcohol use: Not Currently        Vitals:    05/17/22 1523   BP: 134/72   Pulse: 108   SpO2: 98%   Weight: (!) 438 lb (198.7 kg)     Estimated body mass index is 75.18 kg/m² as calculated from the following:    Height as of 5/1/22: 5' 4\" (1.626 m). Weight as of this encounter: 438 lb (198.7 kg). Physical Exam  Vitals and nursing note reviewed. Constitutional:       Appearance: She is well-developed. HENT:      Head: Normocephalic and atraumatic. Right Ear: External ear normal.      Left Ear: External ear normal.      Nose: Nose normal.   Eyes:      Conjunctiva/sclera: Conjunctivae normal.      Pupils: Pupils are equal, round, and reactive to light. Cardiovascular:      Rate and Rhythm: Normal rate and regular rhythm. Heart sounds: Normal heart sounds. Pulmonary:      Effort: Pulmonary effort is normal.      Breath sounds: Normal breath sounds. No wheezing. Abdominal:      General: Bowel sounds are normal.      Palpations: Abdomen is soft. Tenderness:  There is no abdominal tenderness. Musculoskeletal:         General: Normal range of motion. Skin:     General: Skin is warm and dry. Neurological:      Mental Status: She is alert and oriented to person, place, and time. Psychiatric:         Behavior: Behavior normal.         ASSESSMENT/PLAN:  1. New onset type 2 diabetes mellitus ([facility]  Patient will need to keep a log of his glucose readings and bring them to the office. He needs to monitor his diet and exercise. Attempt to lose weight. Discussed proper eating habits. Continue to take medication as prescribed. Will obtain A1C at this visit. Educated on signs and symptoms for immediate evaluation in the ER. 2. Cellulitis of right upper extremity  Take medication as prescribed. Push fluids and rest  Discussed conservative treatment  RTC if no improved. Tylenol/Ibuprofen for pain      Return in about 3 months (around 8/17/2022).

## 2022-05-21 ASSESSMENT — ENCOUNTER SYMPTOMS
TROUBLE SWALLOWING: 0
CHEST TIGHTNESS: 0
COUGH: 0
SHORTNESS OF BREATH: 0
DIARRHEA: 0
NAUSEA: 0
VOMITING: 0
FACIAL SWELLING: 0
RHINORRHEA: 0
WHEEZING: 0
ABDOMINAL PAIN: 0
SINUS PRESSURE: 0

## 2022-05-22 ENCOUNTER — PATIENT MESSAGE (OUTPATIENT)
Dept: FAMILY MEDICINE CLINIC | Age: 27
End: 2022-05-22

## 2022-05-23 DIAGNOSIS — E11.9 NEW ONSET TYPE 2 DIABETES MELLITUS (HCC): Primary | ICD-10-CM

## 2022-05-23 RX ORDER — LANCETS
2 EACH MISCELLANEOUS
Qty: 90 EACH | Refills: 2 | Status: SHIPPED | OUTPATIENT
Start: 2022-05-23 | End: 2022-06-02 | Stop reason: SDUPTHER

## 2022-05-23 RX ORDER — GLUCOSAMINE HCL/CHONDROITIN SU 500-400 MG
CAPSULE ORAL
Qty: 300 STRIP | Refills: 5 | Status: SHIPPED | OUTPATIENT
Start: 2022-05-23 | End: 2022-06-02 | Stop reason: SDUPTHER

## 2022-05-23 NOTE — TELEPHONE ENCOUNTER
From: Amanda Guo  To: Dr. Yoanna Palafox: 5/22/2022 7:29 PM EDT  Subject: Refill    Im running low on my test strips and wonder if I can get a 14 day supply sent to my local pharmacy and also a refill for my needles for my insulin pins and for my metformin and lancets

## 2022-06-02 RX ORDER — INSULIN ASPART 100 [IU]/ML
0-10 INJECTION, SOLUTION INTRAVENOUS; SUBCUTANEOUS
Qty: 5 PEN | Refills: 3 | OUTPATIENT
Start: 2022-06-02

## 2022-06-02 RX ORDER — INSULIN GLARGINE 100 [IU]/ML
33 INJECTION, SOLUTION SUBCUTANEOUS NIGHTLY
Qty: 15 PEN | Refills: 0 | Status: SHIPPED | OUTPATIENT
Start: 2022-06-02 | End: 2022-08-02

## 2022-06-02 RX ORDER — BLOOD-GLUCOSE METER
1 EACH MISCELLANEOUS
Qty: 1 KIT | Refills: 1 | Status: SHIPPED | OUTPATIENT
Start: 2022-06-02

## 2022-06-02 RX ORDER — LANCETS
2 EACH MISCELLANEOUS
Qty: 90 EACH | Refills: 2 | Status: SHIPPED | OUTPATIENT
Start: 2022-06-02 | End: 2022-08-31

## 2022-06-02 RX ORDER — GLUCOSAMINE HCL/CHONDROITIN SU 500-400 MG
CAPSULE ORAL
Qty: 300 STRIP | Refills: 5 | Status: SHIPPED | OUTPATIENT
Start: 2022-06-02

## 2022-06-02 RX ORDER — INSULIN ASPART 100 [IU]/ML
8 INJECTION, SOLUTION INTRAVENOUS; SUBCUTANEOUS
Qty: 15 PEN | Refills: 3 | Status: SHIPPED | OUTPATIENT
Start: 2022-06-02

## 2022-08-02 RX ORDER — INSULIN GLARGINE 100 [IU]/ML
INJECTION, SOLUTION SUBCUTANEOUS
Qty: 30 ML | Refills: 1 | Status: SHIPPED | OUTPATIENT
Start: 2022-08-02

## 2022-08-02 NOTE — TELEPHONE ENCOUNTER
Last office visit 5/17/2022     Last written     Next office visit scheduled 8/17/2022    Requested Prescriptions     Pending Prescriptions Disp Refills    metFORMIN (GLUCOPHAGE) 500 MG tablet [Pharmacy Med Name: Siri Vieraon 500 MG Tablet] 60 tablet 0     Sig: TAKE 1 TABLET TWICE DAILY WITH MEALS    LANTUS SOLOSTAR 100 UNIT/ML injection pen [Pharmacy Med Name: LANTUS SOLOSTAR 100 UNIT/ML Solution Pen-injector] 30 mL      Sig: INJECT 33 UNITS SUBCUTANEOUSLY EVERY NIGHT

## 2022-08-23 ENCOUNTER — TELEPHONE (OUTPATIENT)
Dept: FAMILY MEDICINE CLINIC | Age: 27
End: 2022-08-23

## 2022-08-23 NOTE — TELEPHONE ENCOUNTER
[employer] received paperwork faxed from Dr. Nicolle Ortiz. They want to know if Dr. Nicolle Ortiz thinks this will be a continuous leave or intermittent. [phone].

## 2023-02-01 ENCOUNTER — TELEPHONE (OUTPATIENT)
Dept: FAMILY MEDICINE CLINIC | Age: 28
End: 2023-02-01

## 2023-02-01 NOTE — TELEPHONE ENCOUNTER
FMLA forms received, pt has not been seen in the office since 5/2022. LVM for pt to call the office, will need to see in office prior to completion, forms were initiated and given to Dr. Mena Schmitt.

## 2023-02-17 ENCOUNTER — OFFICE VISIT (OUTPATIENT)
Dept: FAMILY MEDICINE CLINIC | Age: 28
End: 2023-02-17

## 2023-02-17 VITALS
BODY MASS INDEX: 50.02 KG/M2 | DIASTOLIC BLOOD PRESSURE: 88 MMHG | WEIGHT: 293 LBS | HEIGHT: 64 IN | SYSTOLIC BLOOD PRESSURE: 132 MMHG

## 2023-02-17 DIAGNOSIS — E11.9 NEW ONSET TYPE 2 DIABETES MELLITUS (HCC): ICD-10-CM

## 2023-02-17 DIAGNOSIS — E66.01 CLASS 3 SEVERE OBESITY WITH SERIOUS COMORBIDITY AND BODY MASS INDEX (BMI) GREATER THAN OR EQUAL TO 70 IN ADULT, UNSPECIFIED OBESITY TYPE (HCC): Primary | ICD-10-CM

## 2023-02-17 DIAGNOSIS — N39.41 URGENCY INCONTINENCE: ICD-10-CM

## 2023-02-17 LAB
A/G RATIO: 1.3 (ref 1.1–2.2)
ALBUMIN SERPL-MCNC: 4.1 G/DL (ref 3.4–5)
ALP BLD-CCNC: 87 U/L (ref 40–129)
ALT SERPL-CCNC: 12 U/L (ref 10–40)
ANION GAP SERPL CALCULATED.3IONS-SCNC: 10 MMOL/L (ref 3–16)
AST SERPL-CCNC: 13 U/L (ref 15–37)
BASOPHILS ABSOLUTE: 0 K/UL (ref 0–0.2)
BASOPHILS RELATIVE PERCENT: 0.4 %
BILIRUB SERPL-MCNC: 0.3 MG/DL (ref 0–1)
BUN BLDV-MCNC: 8 MG/DL (ref 7–20)
CALCIUM SERPL-MCNC: 9.3 MG/DL (ref 8.3–10.6)
CHLORIDE BLD-SCNC: 102 MMOL/L (ref 99–110)
CHOLESTEROL, TOTAL: 147 MG/DL (ref 0–199)
CO2: 26 MMOL/L (ref 21–32)
CREAT SERPL-MCNC: 0.6 MG/DL (ref 0.6–1.1)
EOSINOPHILS ABSOLUTE: 0.1 K/UL (ref 0–0.6)
EOSINOPHILS RELATIVE PERCENT: 1.4 %
GFR SERPL CREATININE-BSD FRML MDRD: >60 ML/MIN/{1.73_M2}
GLUCOSE BLD-MCNC: 136 MG/DL (ref 70–99)
HBA1C MFR BLD: 6.1 %
HCT VFR BLD CALC: 36.3 % (ref 36–48)
HDLC SERPL-MCNC: 34 MG/DL (ref 40–60)
HEMOGLOBIN: 11.6 G/DL (ref 12–16)
LDL CHOLESTEROL CALCULATED: 93 MG/DL
LYMPHOCYTES ABSOLUTE: 2 K/UL (ref 1–5.1)
LYMPHOCYTES RELATIVE PERCENT: 35.5 %
MCH RBC QN AUTO: 23.8 PG (ref 26–34)
MCHC RBC AUTO-ENTMCNC: 32 G/DL (ref 31–36)
MCV RBC AUTO: 74.5 FL (ref 80–100)
MONOCYTES ABSOLUTE: 0.4 K/UL (ref 0–1.3)
MONOCYTES RELATIVE PERCENT: 7.8 %
NEUTROPHILS ABSOLUTE: 3 K/UL (ref 1.7–7.7)
NEUTROPHILS RELATIVE PERCENT: 54.9 %
PDW BLD-RTO: 17.9 % (ref 12.4–15.4)
PLATELET # BLD: 279 K/UL (ref 135–450)
PMV BLD AUTO: 9 FL (ref 5–10.5)
POTASSIUM SERPL-SCNC: 4.8 MMOL/L (ref 3.5–5.1)
RBC # BLD: 4.87 M/UL (ref 4–5.2)
SODIUM BLD-SCNC: 138 MMOL/L (ref 136–145)
TOTAL PROTEIN: 7.3 G/DL (ref 6.4–8.2)
TRIGL SERPL-MCNC: 100 MG/DL (ref 0–150)
VLDLC SERPL CALC-MCNC: 20 MG/DL
WBC # BLD: 5.5 K/UL (ref 4–11)

## 2023-02-17 RX ORDER — METRONIDAZOLE 500 MG/1
TABLET ORAL
COMMUNITY
Start: 2023-02-16 | End: 2023-02-19

## 2023-02-17 RX ORDER — METRONIDAZOLE 500 MG/1
500 TABLET ORAL 2 TIMES DAILY
COMMUNITY
Start: 2023-02-16 | End: 2023-02-23

## 2023-02-17 ASSESSMENT — ENCOUNTER SYMPTOMS
TROUBLE SWALLOWING: 0
NAUSEA: 0
VOMITING: 0
ABDOMINAL PAIN: 0
SINUS PRESSURE: 0
DIARRHEA: 0
RHINORRHEA: 0
SHORTNESS OF BREATH: 0
COUGH: 0

## 2023-02-17 NOTE — PROGRESS NOTES
2023     Anette Dallas (:  1995) is a 33 y.o. female, here for evaluation of the following medical concerns:    HPI    Today, denied chest pain, sob,n , v or diarrhea. Obesity- understands the need to lose weight, would like to discuss with bariatric surgery. DM II  Today, patient returned for follow up from DKA 2 last year that caused her to be hospitalized. Today, A1C is 6.1, on insulin at 33 units, will decrease to 30 units. DM II- glucose according to the patient has been 150, taking her medication as prescribed, no side effects, understands the need to lose weight and exercise. Is not currently on ACE or Statin will discuss this. Pap smear- will follow up with   Today, denied chest pain, sob,n, , vor diarrhea. Review of Systems   Constitutional:  Positive for fatigue. Negative for activity change, fever and unexpected weight change. HENT:  Negative for congestion, ear pain, rhinorrhea, sinus pressure and trouble swallowing. Eyes:  Negative for visual disturbance. Respiratory:  Negative for cough and shortness of breath. Cardiovascular:  Negative for chest pain and leg swelling. Gastrointestinal:  Negative for abdominal pain, diarrhea, nausea and vomiting. Musculoskeletal:  Positive for arthralgias. Skin:  Negative for rash. Neurological:  Negative for dizziness, numbness and headaches. Psychiatric/Behavioral:  Negative for dysphoric mood. The patient is not nervous/anxious. Prior to Visit Medications    Medication Sig Taking?  Authorizing Provider   metroNIDAZOLE (FLAGYL) 500 MG tablet Take 500 mg by mouth 2 times daily Yes Historical Provider, MD   metFORMIN (GLUCOPHAGE) 500 MG tablet TAKE 1 TABLET TWICE DAILY WITH MEALS Yes Bennie Ring, DO   LANTUS SOLOSTAR 100 UNIT/ML injection pen INJECT 33 UNITS SUBCUTANEOUSLY EVERY NIGHT Yes Bennie Ring, DO   blood glucose monitor strips Test 3 times a day & as needed for symptoms of irregular blood glucose. Dispense sufficient amount for indicated testing frequency plus additional to accommodate PRN testing needs Pharmacy to make formulary changes as needed Yes Elissa Tan DO   Insulin Pen Needle 32G X 4 MM MISC 1 each by Does not apply route daily Yes Bennie Ring, DO   insulin aspart (NOVOLOG FLEXPEN) 100 UNIT/ML injection pen Inject 8 Units into the skin 3 times daily (before meals) Yes Elissa Tan, DO   Blood Glucose Monitoring Suppl (ACCU-CHEK GUIDE) w/Device KIT 1 each by Does not apply route 3 times daily (before meals) Pharmacy to make formulary changes as needed Yes Elissa Tan DO   Accu-Chek FastClix Lancets MISC 2 Lancet by Does not apply route 3 times daily (before meals) Pharmacy to make formulary changes as needed  Elissa Tan DO        Social History     Tobacco Use    Smoking status: Never    Smokeless tobacco: Never   Substance Use Topics    Alcohol use: Not Currently        Vitals:    02/17/23 0918   BP: 132/88   Weight: (!) 448 lb (203.2 kg)   Height: 5' 4\" (1.626 m)     Estimated body mass index is 76.9 kg/m² as calculated from the following:    Height as of this encounter: 5' 4\" (1.626 m). Weight as of this encounter: 448 lb (203.2 kg). Physical Exam  Vitals and nursing note reviewed. Constitutional:       Appearance: She is well-developed. HENT:      Head: Normocephalic and atraumatic. Right Ear: External ear normal.      Left Ear: External ear normal.      Nose: Nose normal.   Eyes:      Conjunctiva/sclera: Conjunctivae normal.   Cardiovascular:      Rate and Rhythm: Normal rate and regular rhythm. Heart sounds: Normal heart sounds. Pulmonary:      Effort: Pulmonary effort is normal.      Breath sounds: Normal breath sounds. Abdominal:      General: Bowel sounds are normal.      Palpations: Abdomen is soft. Tenderness: There is no abdominal tenderness. Musculoskeletal:      Cervical back: Normal range of motion and neck supple.    Skin: General: Skin is warm and dry. Neurological:      Mental Status: She is alert and oriented to person, place, and time. Psychiatric:         Behavior: Behavior normal.         Thought Content: Thought content normal.         Judgment: Judgment normal.       ASSESSMENT/PLAN:  1. Class 3 severe obesity with serious comorbidity and body mass index (BMI) greater than or equal to 70 in adult, unspecified obesity type ([address]  Discussed the need to exercise 30 minutes each day. Monitor diet and push water. Reduce refined carbs and replace with fiber and vegetables. Decrease portion size and limit snacking, stop eating after dinner  Count calories. - CBC with Auto Differential  - Lipid Panel  - Comprehensive Metabolic Panel  - Franca Blas,  - Bariatric Surgery, [facility]    2. New onset type 2 diabetes mellitus ([facility]  Patient will need to keep a log of his glucose readings and bring them to the office. He needs to monitor his diet and exercise. Attempt to lose weight. Discussed proper eating habits. Continue to take medication as prescribed. Will obtain A1C at this visit. Educated on signs and symptoms for immediate evaluation in the ER.    - CBC with Auto Differential  - Lipid Panel  - Comprehensive Metabolic Panel  - POCT glycosylated hemoglobin (Hb A1C)    3. Urgency incontinence  Patient is requesting more bathroom breaks at work  Will sign [facility] paperwork to allow this. Questions answered. Return in about 3 months (around 5/17/2023).

## 2023-02-19 PROBLEM — N39.41 URGENCY INCONTINENCE: Status: ACTIVE | Noted: 2023-02-19

## 2023-03-07 ENCOUNTER — TELEPHONE (OUTPATIENT)
Dept: BARIATRICS/WEIGHT MGMT | Age: 28
End: 2023-03-07

## 2023-03-07 NOTE — TELEPHONE ENCOUNTER
Lvm regarding seminar,    Pt does have coverage, with [employer], 0 diet.     Bmi form scanned into media

## 2023-03-20 NOTE — TELEPHONE ENCOUNTER
Last office visit 2/17/2023     Last written      Next office visit scheduled Visit date not found    Requested Prescriptions     Pending Prescriptions Disp Refills    metFORMIN (GLUCOPHAGE) 500 MG tablet [Pharmacy Med Name: METFORMIN HYDROCHLORIDE 500 MG Tablet] 120 tablet      Sig: TAKE 1 TABLET TWICE DAILY WITH MEALS

## 2023-04-04 ENCOUNTER — TELEPHONE (OUTPATIENT)
Dept: BARIATRICS/WEIGHT MGMT | Age: 28
End: 2023-04-04

## 2023-04-04 NOTE — TELEPHONE ENCOUNTER
Called as a new pt courtesy call - left message. Told patient to have new pt paperwork completely filled out, insurance card, and id and to arrive on time at [facility] location. If they didn't have the paperwork filled out and arrive on time may be rescheduled. Also stated if they didn't receive paperwork to let us know so we could get it to them another way. Left office number on message.

## 2023-12-05 ENCOUNTER — OFFICE VISIT (OUTPATIENT)
Dept: FAMILY MEDICINE CLINIC | Age: 28
End: 2023-12-05
Payer: COMMERCIAL

## 2023-12-05 VITALS
WEIGHT: 293 LBS | HEIGHT: 64 IN | DIASTOLIC BLOOD PRESSURE: 84 MMHG | BODY MASS INDEX: 50.02 KG/M2 | SYSTOLIC BLOOD PRESSURE: 130 MMHG

## 2023-12-05 DIAGNOSIS — H60.503 ACUTE OTITIS EXTERNA OF BOTH EARS, UNSPECIFIED TYPE: ICD-10-CM

## 2023-12-05 DIAGNOSIS — Z00.00 ENCOUNTER FOR WELL ADULT EXAM WITHOUT ABNORMAL FINDINGS: Primary | ICD-10-CM

## 2023-12-05 DIAGNOSIS — D64.9 ANEMIA, UNSPECIFIED TYPE: ICD-10-CM

## 2023-12-05 DIAGNOSIS — Z23 NEED FOR PROPHYLACTIC VACCINATION WITH TETANUS-DIPHTHERIA (TD): ICD-10-CM

## 2023-12-05 LAB
ALBUMIN SERPL-MCNC: 4 G/DL (ref 3.4–5)
ALBUMIN/GLOB SERPL: 1.3 {RATIO} (ref 1.1–2.2)
ALP SERPL-CCNC: 82 U/L (ref 40–129)
ALT SERPL-CCNC: 15 U/L (ref 10–40)
ANION GAP SERPL CALCULATED.3IONS-SCNC: 9 MMOL/L (ref 3–16)
AST SERPL-CCNC: 13 U/L (ref 15–37)
BASOPHILS # BLD: 0 K/UL (ref 0–0.2)
BASOPHILS NFR BLD: 0.6 %
BILIRUB SERPL-MCNC: <0.2 MG/DL (ref 0–1)
BUN SERPL-MCNC: 9 MG/DL (ref 7–20)
CALCIUM SERPL-MCNC: 9 MG/DL (ref 8.3–10.6)
CHLORIDE SERPL-SCNC: 103 MMOL/L (ref 99–110)
CHOLEST SERPL-MCNC: 134 MG/DL (ref 0–199)
CO2 SERPL-SCNC: 26 MMOL/L (ref 21–32)
CREAT SERPL-MCNC: 0.6 MG/DL (ref 0.6–1.1)
DEPRECATED RDW RBC AUTO: 17.5 % (ref 12.4–15.4)
EOSINOPHIL # BLD: 0.1 K/UL (ref 0–0.6)
EOSINOPHIL NFR BLD: 1.3 %
GFR SERPLBLD CREATININE-BSD FMLA CKD-EPI: >60 ML/MIN/{1.73_M2}
GLUCOSE SERPL-MCNC: 101 MG/DL (ref 70–99)
HCT VFR BLD AUTO: 31.4 % (ref 36–48)
HDLC SERPL-MCNC: 36 MG/DL (ref 40–60)
HGB BLD-MCNC: 9.7 G/DL (ref 12–16)
LDLC SERPL CALC-MCNC: 80 MG/DL
LYMPHOCYTES # BLD: 1.7 K/UL (ref 1–5.1)
LYMPHOCYTES NFR BLD: 32.6 %
MCH RBC QN AUTO: 21.9 PG (ref 26–34)
MCHC RBC AUTO-ENTMCNC: 31 G/DL (ref 31–36)
MCV RBC AUTO: 70.6 FL (ref 80–100)
MONOCYTES # BLD: 0.6 K/UL (ref 0–1.3)
MONOCYTES NFR BLD: 11 %
NEUTROPHILS # BLD: 2.8 K/UL (ref 1.7–7.7)
NEUTROPHILS NFR BLD: 54.5 %
PLATELET # BLD AUTO: 381 K/UL (ref 135–450)
PMV BLD AUTO: 8.3 FL (ref 5–10.5)
POTASSIUM SERPL-SCNC: 4.1 MMOL/L (ref 3.5–5.1)
PROT SERPL-MCNC: 7 G/DL (ref 6.4–8.2)
RBC # BLD AUTO: 4.45 M/UL (ref 4–5.2)
SODIUM SERPL-SCNC: 138 MMOL/L (ref 136–145)
TRIGL SERPL-MCNC: 90 MG/DL (ref 0–150)
VLDLC SERPL CALC-MCNC: 18 MG/DL
WBC # BLD AUTO: 5.2 K/UL (ref 4–11)

## 2023-12-05 PROCEDURE — 99395 PREV VISIT EST AGE 18-39: CPT | Performed by: FAMILY MEDICINE

## 2023-12-05 PROCEDURE — 36415 COLL VENOUS BLD VENIPUNCTURE: CPT | Performed by: FAMILY MEDICINE

## 2023-12-05 PROCEDURE — 90471 IMMUNIZATION ADMIN: CPT | Performed by: FAMILY MEDICINE

## 2023-12-05 PROCEDURE — 90715 TDAP VACCINE 7 YRS/> IM: CPT | Performed by: FAMILY MEDICINE

## 2023-12-05 RX ORDER — CIPROFLOXACIN AND DEXAMETHASONE 3; 1 MG/ML; MG/ML
4 SUSPENSION/ DROPS AURICULAR (OTIC) 2 TIMES DAILY
Qty: 1 ML | Refills: 0 | Status: SHIPPED | OUTPATIENT
Start: 2023-12-05 | End: 2023-12-12

## 2023-12-05 ASSESSMENT — PATIENT HEALTH QUESTIONNAIRE - PHQ9
SUM OF ALL RESPONSES TO PHQ9 QUESTIONS 1 & 2: 0
1. LITTLE INTEREST OR PLEASURE IN DOING THINGS: 0
2. FEELING DOWN, DEPRESSED OR HOPELESS: NOT AT ALL
2. FEELING DOWN, DEPRESSED OR HOPELESS: 0
SUM OF ALL RESPONSES TO PHQ QUESTIONS 1-9: 0
SUM OF ALL RESPONSES TO PHQ9 QUESTIONS 1 & 2: 0
SUM OF ALL RESPONSES TO PHQ QUESTIONS 1-9: 0
1. LITTLE INTEREST OR PLEASURE IN DOING THINGS: NOT AT ALL

## 2023-12-05 NOTE — PROGRESS NOTES
Well Adult Note  Name: Yulia Wen Date: 2023   MRN: [id number] Sex: Female   Age: 33 y.o. Ethnicity: Non- / Non    : 1995 Race: [facility] and the [facility] is here for well adult exam.  History:    -need to discuss vaccinations and the need from these  -need to discuss HIV testing and basic labs. -need to discuss healthy eating habits and exercise.   -need to discuss age appropriate screening recommendations    Bilateral ear pain    Anemia    Today, denied chest pain, sob, n, v ,or diarrhea     Review of Systems   Constitutional:  Positive for fatigue. Negative for activity change, fever and unexpected weight change. HENT:  Positive for ear pain and hearing loss. Negative for congestion, facial swelling, mouth sores, rhinorrhea, sinus pressure, sore throat, trouble swallowing and voice change. Eyes:  Negative for visual disturbance. Respiratory:  Negative for cough, chest tightness, shortness of breath and wheezing. Cardiovascular:  Negative for chest pain and leg swelling. Gastrointestinal:  Negative for abdominal pain, diarrhea, nausea and vomiting. Endocrine: Negative for cold intolerance, heat intolerance, polydipsia and polyphagia. Musculoskeletal:  Negative for arthralgias. Skin:  Negative for rash. Neurological:  Negative for dizziness, numbness and headaches. Psychiatric/Behavioral:  Negative for dysphoric mood. The patient is not nervous/anxious. No Known Allergies      Prior to Visit Medications    Medication Sig Taking?  Authorizing Provider   ciprofloxacin-dexamethasone (CIPRODEX) 0.3-0.1 % otic suspension Place 4 drops into the left ear 2 times daily for 7 days Yes Bennie Ring DO   LANTUS SOLOSTAR 100 UNIT/ML injection pen INJECT  33 UNITS SUBCUTANEOUSLY EVERY NIGHT Yes Bennie Ring, DO   metFORMIN (GLUCOPHAGE) 500 MG tablet TAKE 1 TABLET TWICE DAILY WITH MEALS Yes Bennie Ring, DO   blood glucose monitor

## 2023-12-06 LAB
EST. AVERAGE GLUCOSE BLD GHB EST-MCNC: 134.1 MG/DL
HBA1C MFR BLD: 6.3 %

## 2023-12-09 ASSESSMENT — ENCOUNTER SYMPTOMS
WHEEZING: 0
VOICE CHANGE: 0
ABDOMINAL PAIN: 0
CHEST TIGHTNESS: 0
SORE THROAT: 0
RHINORRHEA: 0
COUGH: 0
VOMITING: 0
DIARRHEA: 0
TROUBLE SWALLOWING: 0
NAUSEA: 0
FACIAL SWELLING: 0
SHORTNESS OF BREATH: 0
SINUS PRESSURE: 0

## 2024-07-08 RX ORDER — LANCETS
2 EACH MISCELLANEOUS
Qty: 90 EACH | Refills: 2 | Status: SHIPPED | OUTPATIENT
Start: 2024-07-08 | End: 2024-10-06

## 2024-07-08 RX ORDER — INSULIN ASPART 100 [IU]/ML
8 INJECTION, SOLUTION INTRAVENOUS; SUBCUTANEOUS
Qty: 5 ADJUSTABLE DOSE PRE-FILLED PEN SYRINGE | Refills: 0 | Status: SHIPPED | OUTPATIENT
Start: 2024-07-08

## 2024-07-08 RX ORDER — BLOOD-GLUCOSE METER
1 EACH MISCELLANEOUS
Qty: 1 KIT | Refills: 1 | Status: SHIPPED | OUTPATIENT
Start: 2024-07-08

## 2024-07-08 RX ORDER — GLUCOSAMINE HCL/CHONDROITIN SU 500-400 MG
CAPSULE ORAL
Qty: 300 STRIP | Refills: 5 | Status: SHIPPED | OUTPATIENT
Start: 2024-07-08

## 2024-07-08 RX ORDER — INSULIN GLARGINE 100 [IU]/ML
INJECTION, SOLUTION SUBCUTANEOUS
Qty: 30 ML | Refills: 1 | Status: SHIPPED | OUTPATIENT
Start: 2024-07-08

## 2024-07-08 NOTE — TELEPHONE ENCOUNTER
Last office visit 2023       Next office visit scheduled Visit date not found    Requested Prescriptions     Pending Prescriptions Disp Refills    blood glucose monitor strips 300 strip 5     Sig: Test 3 times a day & as needed for symptoms of irregular blood glucose. Dispense sufficient amount for indicated testing frequency plus additional to accommodate PRN testing needs Pharmacy to make formulary changes as needed    Insulin Pen Needle 32G X 4 MM MISC 100 each 3     Si each by Does not apply route daily    Accu-Chek FastClix Lancets MISC 90 each 2     Si Lancet  by Does not apply route 3 times daily (before meals) Pharmacy to make formulary changes as needed    insulin aspart (NOVOLOG FLEXPEN) 100 UNIT/ML injection pen       Sig: Inject 8 Units into the skin 3 times daily (before meals)    Blood Glucose Monitoring Suppl (ACCU-CHEK GUIDE) w/Device KIT 1 kit 1     Si each by Does not apply route 3 times daily (before meals) Pharmacy to make formulary changes as needed    metFORMIN (GLUCOPHAGE) 500 MG tablet 120 tablet 2     Sig: Take 1 tablet by mouth 2 times daily (with meals)    insulin glargine (LANTUS SOLOSTAR) 100 UNIT/ML injection pen 30 mL 1

## 2024-07-23 RX ORDER — BLOOD-GLUCOSE METER
1 EACH MISCELLANEOUS
Qty: 1 KIT | Refills: 1 | Status: SHIPPED | OUTPATIENT
Start: 2024-07-23

## 2024-07-23 RX ORDER — LANCETS
2 EACH MISCELLANEOUS
Qty: 90 EACH | Refills: 2 | Status: SHIPPED | OUTPATIENT
Start: 2024-07-23 | End: 2024-10-21

## 2024-07-23 RX ORDER — GLUCOSAMINE HCL/CHONDROITIN SU 500-400 MG
CAPSULE ORAL
Qty: 300 STRIP | Refills: 5 | Status: SHIPPED | OUTPATIENT
Start: 2024-07-23

## 2024-07-23 RX ORDER — INSULIN GLARGINE 100 [IU]/ML
INJECTION, SOLUTION SUBCUTANEOUS
Qty: 30 ML | Refills: 1 | Status: SHIPPED | OUTPATIENT
Start: 2024-07-23

## 2024-07-23 RX ORDER — INSULIN ASPART 100 [IU]/ML
8 INJECTION, SOLUTION INTRAVENOUS; SUBCUTANEOUS
Qty: 5 ADJUSTABLE DOSE PRE-FILLED PEN SYRINGE | Refills: 0 | Status: SHIPPED | OUTPATIENT
Start: 2024-07-23

## 2024-07-30 ENCOUNTER — TELEPHONE (OUTPATIENT)
Dept: FAMILY MEDICINE CLINIC | Age: 29
End: 2024-07-30

## 2024-07-30 RX ORDER — INSULIN LISPRO 100 [IU]/ML
8 INJECTION, SOLUTION INTRAVENOUS; SUBCUTANEOUS
Qty: 5 ADJUSTABLE DOSE PRE-FILLED PEN SYRINGE | Refills: 0 | Status: SHIPPED | OUTPATIENT
Start: 2024-07-30

## 2024-07-30 NOTE — TELEPHONE ENCOUNTER
Nayeli with [employer] called stating pt's insurance company does not cover her novalog flex pen. She offered alternatives of, humalog quick pen 3  per ML, or insulin lispro 10 3  per ML. Nayeli's contact number is [phone]

## 2024-09-10 ENCOUNTER — TELEPHONE (OUTPATIENT)
Dept: FAMILY MEDICINE CLINIC | Age: 29
End: 2024-09-10

## 2025-03-15 ASSESSMENT — PATIENT HEALTH QUESTIONNAIRE - PHQ9
SUM OF ALL RESPONSES TO PHQ QUESTIONS 1-9: 1
SUM OF ALL RESPONSES TO PHQ9 QUESTIONS 1 & 2: 1
SUM OF ALL RESPONSES TO PHQ QUESTIONS 1-9: 1
1. LITTLE INTEREST OR PLEASURE IN DOING THINGS: NOT AT ALL
1. LITTLE INTEREST OR PLEASURE IN DOING THINGS: NOT AT ALL
2. FEELING DOWN, DEPRESSED OR HOPELESS: SEVERAL DAYS
2. FEELING DOWN, DEPRESSED OR HOPELESS: SEVERAL DAYS
SUM OF ALL RESPONSES TO PHQ QUESTIONS 1-9: 1
SUM OF ALL RESPONSES TO PHQ QUESTIONS 1-9: 1

## 2025-03-17 ENCOUNTER — OFFICE VISIT (OUTPATIENT)
Dept: PRIMARY CARE CLINIC | Age: 30
End: 2025-03-17
Payer: COMMERCIAL

## 2025-03-17 VITALS
TEMPERATURE: 97.4 F | HEART RATE: 90 BPM | WEIGHT: 293 LBS | HEIGHT: 64 IN | BODY MASS INDEX: 50.02 KG/M2 | DIASTOLIC BLOOD PRESSURE: 68 MMHG | OXYGEN SATURATION: 97 % | RESPIRATION RATE: 18 BRPM | SYSTOLIC BLOOD PRESSURE: 132 MMHG

## 2025-03-17 DIAGNOSIS — E66.01 MORBID OBESITY DUE TO EXCESS CALORIES: ICD-10-CM

## 2025-03-17 DIAGNOSIS — K21.00 GASTROESOPHAGEAL REFLUX DISEASE WITH ESOPHAGITIS WITHOUT HEMORRHAGE: ICD-10-CM

## 2025-03-17 DIAGNOSIS — Z00.00 ENCOUNTER FOR WELL ADULT EXAM WITHOUT ABNORMAL FINDINGS: ICD-10-CM

## 2025-03-17 DIAGNOSIS — E11.9 NEW ONSET TYPE 2 DIABETES MELLITUS (HCC): Primary | ICD-10-CM

## 2025-03-17 LAB — HBA1C MFR BLD: 6.4 %

## 2025-03-17 PROCEDURE — 83036 HEMOGLOBIN GLYCOSYLATED A1C: CPT | Performed by: FAMILY MEDICINE

## 2025-03-17 PROCEDURE — 99395 PREV VISIT EST AGE 18-39: CPT | Performed by: FAMILY MEDICINE

## 2025-03-17 RX ORDER — LORATADINE 10 MG/1
10 TABLET ORAL DAILY
Qty: 30 TABLET | Refills: 2 | Status: SHIPPED | OUTPATIENT
Start: 2025-03-17 | End: 2025-06-15

## 2025-03-17 ASSESSMENT — ENCOUNTER SYMPTOMS
SHORTNESS OF BREATH: 0
ABDOMINAL PAIN: 0
TROUBLE SWALLOWING: 0
DIARRHEA: 0
CHEST TIGHTNESS: 0
NAUSEA: 0
COUGH: 0
WHEEZING: 0
VOMITING: 0

## 2025-03-17 NOTE — PROGRESS NOTES
12/10/2009    Influenza A ([id number]),all Formulations 11/17/2009    Influenza Virus Vaccine 10/31/2024    Influenza Whole 02/03/2012    Influenza, FLUARIX, FLULAVAL, FLUZONE (age 6 mo+) and AFLURIA, (age 3 y+), Quadv PF, 0.5mL 01/15/2014    Influenza, FLUCELVAX, (age 6 mo+), MDCK, Quadv PF, 0.5mL 03/04/2010, 10/07/2010    MMR, PRIORIX, M-M-R II, (age 12m+), SC, 0.5mL 08/04/1999, 09/13/2001    Meningococcal ACWY, MENACTRA (MenACWY-D), (age 9m-55y), IM, 0.5mL 06/09/2009, 08/11/2014    Polio OPV 1995, 1995, 08/04/1999    Poliovirus, IPOL, (age 6w+), SC/IM, 0.5mL 09/13/2001    TDaP, ADACEL (age 10y-64y), BOOSTRIX (age 10y+), IM, 0.5mL 06/09/2009, 12/05/2023    Varicella, VARIVAX, (age 12m+), SC, 0.5mL 02/17/1997, 06/09/2009        Health Maintenance Due   Topic Date Due    Diabetic foot exam  Never done    HIV screen  Never done    Diabetic Alb to Cr ratio (uACR) test  Never done    Hepatitis C screen  Never done    Pneumococcal 0-49 years Vaccine (1 of 2 - PCV) Never done    Pap smear  Never done    Diabetic retinal exam  03/10/2024    COVID-19 Vaccine (2 - 2024-25 season) 09/01/2024    Lipids  12/05/2024    GFR test (Diabetes, CKD 3-4, OR last GFR 15-59)  12/05/2024      Recommendations for Preventive Services Due: see orders and patient instructions/AVS.

## 2025-03-18 NOTE — PATIENT INSTRUCTIONS
hands, brush your teeth twice a day, and wear a seat belt in the car.   Where can you learn more?  Go to https://[email or web address].net/patientEd and enter P072 to learn more about \"Well Visit, Ages 18 to 65: Care Instructions.\"  Current as of: April 30, 2024  Content Version: 14.4  © 1967-3820 [employer].   Care instructions adapted under license by [employer]. If you have questions about a medical condition or this instruction, always ask your healthcare professional. [employer] disclaims any warranty or liability for your use of this information.